# Patient Record
Sex: FEMALE | Race: WHITE | NOT HISPANIC OR LATINO
[De-identification: names, ages, dates, MRNs, and addresses within clinical notes are randomized per-mention and may not be internally consistent; named-entity substitution may affect disease eponyms.]

---

## 2019-09-26 ENCOUNTER — APPOINTMENT (OUTPATIENT)
Dept: SURGERY | Facility: CLINIC | Age: 74
End: 2019-09-26
Payer: MEDICARE

## 2019-09-26 VITALS
HEART RATE: 73 BPM | BODY MASS INDEX: 19.73 KG/M2 | HEIGHT: 61 IN | SYSTOLIC BLOOD PRESSURE: 149 MMHG | DIASTOLIC BLOOD PRESSURE: 77 MMHG | WEIGHT: 104.5 LBS

## 2019-09-26 DIAGNOSIS — F17.200 NICOTINE DEPENDENCE, UNSPECIFIED, UNCOMPLICATED: ICD-10-CM

## 2019-09-26 DIAGNOSIS — Z78.9 OTHER SPECIFIED HEALTH STATUS: ICD-10-CM

## 2019-09-26 PROCEDURE — 99203 OFFICE O/P NEW LOW 30 MIN: CPT

## 2019-09-26 RX ORDER — ATORVASTATIN CALCIUM 40 MG/1
40 TABLET, FILM COATED ORAL
Refills: 0 | Status: ACTIVE | COMMUNITY

## 2019-09-28 NOTE — HISTORY OF PRESENT ILLNESS
[de-identified] : Pt c/o anterior forehead mass for several years since hitting forehead with door and states has continued to increase in size and has discomfort.  denies drainage or infection. \par CT  scan performed 4 weeks post injury (2017) :   Soft tissue prominence

## 2019-09-28 NOTE — PHYSICAL EXAM
[de-identified] : no palpable thyroid nodules [Midline] : located in midline position [Normal] : orientation to person, place, and time: normal [FreeTextEntry2] : well healed left supraorbital scar with bony prominence left frontotemporal region

## 2019-09-28 NOTE — CONSULT LETTER
[Dear  ___] : Dear ~NELLIE, [Consult Letter:] : I had the pleasure of evaluating your patient, [unfilled]. [Please see my note below.] : Please see my note below. [Consult Closing:] : Thank you very much for allowing me to participate in the care of this patient.  If you have any questions, please do not hesitate to contact me. [FreeTextEntry2] : Dr. Luis Barth, Dr. Robert Calderón [FreeTextEntry3] : Gómez Rivera MD, FACS\par System Director, Endocrine Surgery\par [facility]\par  [Sincerely,] : Sincerely, [DrChidi  ___] : Dr. LOYD

## 2019-09-30 ENCOUNTER — FORM ENCOUNTER (OUTPATIENT)
Age: 74
End: 2019-09-30

## 2019-10-01 ENCOUNTER — APPOINTMENT (OUTPATIENT)
Dept: CT IMAGING | Facility: CLINIC | Age: 74
End: 2019-10-01
Payer: MEDICARE

## 2019-10-01 ENCOUNTER — OUTPATIENT (OUTPATIENT)
Dept: OUTPATIENT SERVICES | Facility: HOSPITAL | Age: 74
LOS: 1 days | End: 2019-10-01
Payer: MEDICARE

## 2019-10-01 ENCOUNTER — TRANSCRIPTION ENCOUNTER (OUTPATIENT)
Age: 74
End: 2019-10-01

## 2019-10-01 DIAGNOSIS — Z00.00 ENCOUNTER FOR GENERAL ADULT MEDICAL EXAMINATION WITHOUT ABNORMAL FINDINGS: ICD-10-CM

## 2019-10-01 DIAGNOSIS — R22.0 LOCALIZED SWELLING, MASS AND LUMP, HEAD: ICD-10-CM

## 2019-10-01 PROCEDURE — 70450 CT HEAD/BRAIN W/O DYE: CPT

## 2019-10-01 PROCEDURE — 70450 CT HEAD/BRAIN W/O DYE: CPT | Mod: 26

## 2019-10-07 ENCOUNTER — OTHER (OUTPATIENT)
Age: 74
End: 2019-10-07

## 2019-10-10 ENCOUNTER — FORM ENCOUNTER (OUTPATIENT)
Age: 74
End: 2019-10-10

## 2019-10-11 ENCOUNTER — OUTPATIENT (OUTPATIENT)
Dept: OUTPATIENT SERVICES | Facility: HOSPITAL | Age: 74
LOS: 1 days | End: 2019-10-11

## 2019-10-11 ENCOUNTER — APPOINTMENT (OUTPATIENT)
Dept: MRI IMAGING | Facility: CLINIC | Age: 74
End: 2019-10-11
Payer: MEDICARE

## 2019-10-11 PROCEDURE — 70553 MRI BRAIN STEM W/O & W/DYE: CPT | Mod: 26

## 2019-10-15 ENCOUNTER — OTHER (OUTPATIENT)
Age: 74
End: 2019-10-15

## 2019-11-05 ENCOUNTER — APPOINTMENT (OUTPATIENT)
Dept: SPINE | Facility: CLINIC | Age: 74
End: 2019-11-05
Payer: MEDICARE

## 2019-11-05 VITALS
HEART RATE: 70 BPM | WEIGHT: 105 LBS | SYSTOLIC BLOOD PRESSURE: 142 MMHG | HEIGHT: 61 IN | TEMPERATURE: 98 F | BODY MASS INDEX: 19.83 KG/M2 | DIASTOLIC BLOOD PRESSURE: 75 MMHG

## 2019-11-05 PROCEDURE — 99204 OFFICE O/P NEW MOD 45 MIN: CPT

## 2019-11-08 ENCOUNTER — RESULT REVIEW (OUTPATIENT)
Age: 74
End: 2019-11-08

## 2019-11-08 ENCOUNTER — APPOINTMENT (OUTPATIENT)
Dept: ULTRASOUND IMAGING | Facility: IMAGING CENTER | Age: 74
End: 2019-11-08
Payer: MEDICARE

## 2019-11-08 ENCOUNTER — OUTPATIENT (OUTPATIENT)
Dept: OUTPATIENT SERVICES | Facility: HOSPITAL | Age: 74
LOS: 1 days | End: 2019-11-08
Payer: MEDICARE

## 2019-11-08 DIAGNOSIS — R22.0 LOCALIZED SWELLING, MASS AND LUMP, HEAD: ICD-10-CM

## 2019-11-08 PROCEDURE — 88173 CYTOPATH EVAL FNA REPORT: CPT | Mod: 26

## 2019-11-08 PROCEDURE — 88342 IMHCHEM/IMCYTCHM 1ST ANTB: CPT

## 2019-11-08 PROCEDURE — 88360 TUMOR IMMUNOHISTOCHEM/MANUAL: CPT

## 2019-11-08 PROCEDURE — 88185 FLOWCYTOMETRY/TC ADD-ON: CPT

## 2019-11-08 PROCEDURE — 76942 ECHO GUIDE FOR BIOPSY: CPT

## 2019-11-08 PROCEDURE — 88275 CYTOGENETICS 100-300: CPT

## 2019-11-08 PROCEDURE — 88173 CYTOPATH EVAL FNA REPORT: CPT

## 2019-11-08 PROCEDURE — 20206 BIOPSY MUSCLE PERQ NEEDLE: CPT

## 2019-11-08 PROCEDURE — 88342 IMHCHEM/IMCYTCHM 1ST ANTB: CPT | Mod: 26,59

## 2019-11-08 PROCEDURE — 88341 IMHCHEM/IMCYTCHM EA ADD ANTB: CPT

## 2019-11-08 PROCEDURE — 88189 FLOWCYTOMETRY/READ 16 & >: CPT

## 2019-11-08 PROCEDURE — 88341 IMHCHEM/IMCYTCHM EA ADD ANTB: CPT | Mod: 26,59

## 2019-11-08 PROCEDURE — 76942 ECHO GUIDE FOR BIOPSY: CPT | Mod: 26

## 2019-11-08 PROCEDURE — 87205 SMEAR GRAM STAIN: CPT

## 2019-11-08 PROCEDURE — 88365 INSITU HYBRIDIZATION (FISH): CPT

## 2019-11-08 PROCEDURE — 88360 TUMOR IMMUNOHISTOCHEM/MANUAL: CPT | Mod: 26

## 2019-11-08 PROCEDURE — 88184 FLOWCYTOMETRY/ TC 1 MARKER: CPT

## 2019-11-08 PROCEDURE — 88271 CYTOGENETICS DNA PROBE: CPT

## 2019-11-08 PROCEDURE — 88365 INSITU HYBRIDIZATION (FISH): CPT | Mod: 26

## 2019-11-14 LAB — TM INTERPRETATION: SIGNIFICANT CHANGE UP

## 2019-11-19 ENCOUNTER — OUTPATIENT (OUTPATIENT)
Dept: OUTPATIENT SERVICES | Facility: HOSPITAL | Age: 74
LOS: 1 days | Discharge: HOME | End: 2019-11-19

## 2019-11-19 DIAGNOSIS — C85.90 NON-HODGKIN LYMPHOMA, UNSPECIFIED, UNSPECIFIED SITE: ICD-10-CM

## 2019-11-21 ENCOUNTER — RESULT REVIEW (OUTPATIENT)
Age: 74
End: 2019-11-21

## 2019-11-21 ENCOUNTER — APPOINTMENT (OUTPATIENT)
Dept: HEMATOLOGY ONCOLOGY | Facility: CLINIC | Age: 74
End: 2019-11-21
Payer: MEDICARE

## 2019-11-21 ENCOUNTER — OUTPATIENT (OUTPATIENT)
Dept: OUTPATIENT SERVICES | Facility: HOSPITAL | Age: 74
LOS: 1 days | End: 2019-11-21
Payer: MEDICARE

## 2019-11-21 VITALS
OXYGEN SATURATION: 98 % | HEART RATE: 73 BPM | RESPIRATION RATE: 14 BRPM | BODY MASS INDEX: 20.41 KG/M2 | DIASTOLIC BLOOD PRESSURE: 81 MMHG | SYSTOLIC BLOOD PRESSURE: 138 MMHG | TEMPERATURE: 97.6 F | WEIGHT: 108.03 LBS

## 2019-11-21 DIAGNOSIS — Z80.8 FAMILY HISTORY OF MALIGNANT NEOPLASM OF OTHER ORGANS OR SYSTEMS: ICD-10-CM

## 2019-11-21 DIAGNOSIS — C85.90 NON-HODGKIN LYMPHOMA, UNSPECIFIED, UNSPECIFIED SITE: ICD-10-CM

## 2019-11-21 DIAGNOSIS — Z87.19 PERSONAL HISTORY OF OTHER DISEASES OF THE DIGESTIVE SYSTEM: ICD-10-CM

## 2019-11-21 DIAGNOSIS — Z87.09 PERSONAL HISTORY OF OTHER DISEASES OF THE RESPIRATORY SYSTEM: ICD-10-CM

## 2019-11-21 LAB
BASOPHILS # BLD AUTO: 0.1 K/UL — SIGNIFICANT CHANGE UP (ref 0–0.2)
BASOPHILS NFR BLD AUTO: 0.8 % — SIGNIFICANT CHANGE UP (ref 0–2)
EOSINOPHIL # BLD AUTO: 0 K/UL — SIGNIFICANT CHANGE UP (ref 0–0.5)
EOSINOPHIL NFR BLD AUTO: 0 % — SIGNIFICANT CHANGE UP (ref 0–6)
HCT VFR BLD CALC: 41.5 % — SIGNIFICANT CHANGE UP (ref 34.5–45)
HGB BLD-MCNC: 13.9 G/DL — SIGNIFICANT CHANGE UP (ref 11.5–15.5)
LYMPHOCYTES # BLD AUTO: 28 % — SIGNIFICANT CHANGE UP (ref 13–44)
LYMPHOCYTES # BLD AUTO: 3.3 K/UL — SIGNIFICANT CHANGE UP (ref 1–3.3)
MCHC RBC-ENTMCNC: 29.4 PG — SIGNIFICANT CHANGE UP (ref 27–34)
MCHC RBC-ENTMCNC: 33.5 G/DL — SIGNIFICANT CHANGE UP (ref 32–36)
MCV RBC AUTO: 87.8 FL — SIGNIFICANT CHANGE UP (ref 80–100)
MONOCYTES # BLD AUTO: 0.9 K/UL — SIGNIFICANT CHANGE UP (ref 0–0.9)
MONOCYTES NFR BLD AUTO: 7.6 % — SIGNIFICANT CHANGE UP (ref 2–14)
NEUTROPHILS # BLD AUTO: 7.6 K/UL — HIGH (ref 1.8–7.4)
NEUTROPHILS NFR BLD AUTO: 63.6 % — SIGNIFICANT CHANGE UP (ref 43–77)
PLATELET # BLD AUTO: 336 K/UL — SIGNIFICANT CHANGE UP (ref 150–400)
RBC # BLD: 4.73 M/UL — SIGNIFICANT CHANGE UP (ref 3.8–5.2)
RBC # FLD: 12.6 % — SIGNIFICANT CHANGE UP (ref 10.3–14.5)
WBC # BLD: 11.9 K/UL — HIGH (ref 3.8–10.5)
WBC # FLD AUTO: 11.9 K/UL — HIGH (ref 3.8–10.5)

## 2019-11-21 PROCEDURE — 38222 DX BONE MARROW BX & ASPIR: CPT | Mod: LT

## 2019-11-21 PROCEDURE — 88313 SPECIAL STAINS GROUP 2: CPT

## 2019-11-21 PROCEDURE — 88189 FLOWCYTOMETRY/READ 16 & >: CPT | Mod: 59

## 2019-11-21 PROCEDURE — 88342 IMHCHEM/IMCYTCHM 1ST ANTB: CPT | Mod: 26

## 2019-11-21 PROCEDURE — 88341 IMHCHEM/IMCYTCHM EA ADD ANTB: CPT

## 2019-11-21 PROCEDURE — 88237 TISSUE CULTURE BONE MARROW: CPT

## 2019-11-21 PROCEDURE — 88184 FLOWCYTOMETRY/ TC 1 MARKER: CPT

## 2019-11-21 PROCEDURE — 88313 SPECIAL STAINS GROUP 2: CPT | Mod: 26

## 2019-11-21 PROCEDURE — 88185 FLOWCYTOMETRY/TC ADD-ON: CPT

## 2019-11-21 PROCEDURE — 87205 SMEAR GRAM STAIN: CPT

## 2019-11-21 PROCEDURE — 88280 CHROMOSOME KARYOTYPE STUDY: CPT

## 2019-11-21 PROCEDURE — 85097 BONE MARROW INTERPRETATION: CPT

## 2019-11-21 PROCEDURE — 99204 OFFICE O/P NEW MOD 45 MIN: CPT | Mod: 25

## 2019-11-21 PROCEDURE — 88305 TISSUE EXAM BY PATHOLOGIST: CPT | Mod: 26

## 2019-11-21 PROCEDURE — 88305 TISSUE EXAM BY PATHOLOGIST: CPT

## 2019-11-21 PROCEDURE — 88264 CHROMOSOME ANALYSIS 20-25: CPT

## 2019-11-21 PROCEDURE — 88341 IMHCHEM/IMCYTCHM EA ADD ANTB: CPT | Mod: 26

## 2019-11-21 PROCEDURE — 88342 IMHCHEM/IMCYTCHM 1ST ANTB: CPT

## 2019-11-21 NOTE — CONSULT LETTER
[Dear  ___] : Dear  [unfilled], [Consult Letter:] : I had the pleasure of evaluating your patient, [unfilled]. [Please see my note below.] : Please see my note below. [Consult Closing:] : Thank you very much for allowing me to participate in the care of this patient.  If you have any questions, please do not hesitate to contact me. [Sincerely,] : Sincerely, [FreeTextEntry2] : Dr Shan Yan

## 2019-11-21 NOTE — ASSESSMENT
[FreeTextEntry1] : \par 72 yo woman with PMHx of HLP, IBS, referred for evaluation of diffuse large B cell lymphoma. \par \par Pt complaint of left anterior forehead mass for several years since hitting forehead with door and states has continued to increase in size and has discomfort. denies drainage or infection. Patient developed  during the summer time increase in pressure headaches everyday and also noticed blurry vision. Denies N/V. \par \par CT scan of the head 11/6/19: c/w there is mild soft tissue prominence deep to the marker placed over the left inferior frontal scalp without a discrete mass or fluid collection.\par 10/11/2019- MRI of the brain: Left frontal calvarial / sphenoid bone lesion with extracalvarial and dural involvement suspected. 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 Ki-67 50-60%. Diffuse large B cell lymphoma can not be rule out. FISH for MYC, BCL2 and BCL6 are pending. \par Immunohistochemical stains CD20, PAX5, CD10, BCL6, BCL2 ( weak), CD23, c-MYC (20%)\par CD 30 few cells.\par Negative CD5, cyclin D1, P53. \par CD3 stain highlights small T cells in the background.\par CD21 stain highlight few residual follicular dendritic cell meshwork. \par EBV encoded RNA negative. \par \par I had a detailed discussion today with the patient  and friend regarding the natural history, epidemiology, diagnosis, staging and treatment of  Diffuse Large B cell Lymphoma . I reviewed her laboratory studies in detail today. I then discussed  the need to complete staging before discussing treatment. I explained to them that depending on the staging we will determine the treatment. \par \par Plan:\par \par 1- PET/CT ordered\par 2- Echocardiogram\par 3- Mediport\par 4- Bone marrow biopsy was done today. \par \par I answered all their  questions to satisfaction.\par \par Greater than 50% of the encounter time was spent on counseling and coordination of care for DLBCL      and I have spent  45   minutes of face to face time with the patient. \par \par RTC  for discussion of treatment, and review of staging work up 12/11/19

## 2019-11-21 NOTE — PHYSICAL EXAM
[Obese] : obese [Normal] : affect appropriate [Restricted in physically strenuous activity but ambulatory and able to carry out work of a light or sedentary nature] : Status 1- Restricted in physically strenuous activity but ambulatory and able to carry out work of a light or sedentary nature, e.g., light house work, office work [de-identified] :  left  anterior forehead mass approx 3-4 cm in diameter.

## 2019-11-21 NOTE — HISTORY OF PRESENT ILLNESS
[0 - No Distress] : Distress Level: 0 [de-identified] : 74 yo woman with PMHx of HLP, IBS,  referred for evaluation of diffuse large B cell lymphoma. \par \par Pt complaint of  left  anterior forehead mass for several years since hitting forehead with door and states has continued to increase in size and has discomfort. denies drainage or infection. Patient developed  during the summer time increase in pressure headaches everyday and also noticed blurry vision. Denies N/V. \par \par CT scan of the head 11/6/19: c/w there is mild soft tissue prominence deep to the marker placed over the left inferior frontal scalp without a discrete mass or fluid collection.\par 10/11/2019- MRI of the brain: Left frontal calvarial / sphenoid bone lesion with extracalvarial and dural involvement suspected.  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 Ki-67 50-60%. Diffuse large B cell lymphoma can not be rule out. FISH for MYC, BCL2 and BCL6 are pending. \par Immunohistochemical stains CD20, PAX5, CD10, BCL6, BCL2 ( weak), CD23,  c-MYC (20%)\par CD 30 few cells.\par Negative CD5, cyclin D1, P53. \par CD3 stain highlights small T cells in the background.\par CD21 stain highlight few residual follicular dendritic cell meshwork. \par EBV encoded RNA negative.

## 2019-11-21 NOTE — PROCEDURE
[Bone Marrow Biopsy] : bone marrow biopsy [Bone Marrow Aspiration] : bone marrow aspiration  [Patient] : the patient [Verbal Consent Obtained] : verbal consent was obtained prior to the procedure [Patient identification verified] : patient identification verified [Procedure verified and consent obtained] : procedure verified and consent obtained [Laterality verified and correct site marked] : laterality verified and correct site marked [Left] : site: left [Correct positioning] : correct positioning [Prone] : prone [Superior iliac spine was identified] : the superior iliac spine was identified. [The left posterior iliac crest was prepped with betadine and draped, using sterile technique.] : The left posterior iliac crest was prepped with betadine and draped, using sterile technique. [Lidocaine was injected and into the periosteum overlying the site.] : Lidocaine was injected and into the periosteum overlying the site. [Aspirate] : aspirate [Cytogenetics] : cytogenetics [FISH] : FISH [Biopsy] : biopsy [Flow Cytometry] : flow cytometry [] : The patient was instructed to remove the bandage the following AM. The patient may bathe. Acetaminophen may be taken for discomfort, as per package directions.If there are any other problems, the patient was instructed to call the office. The patient verbalized understanding, and is aware of the office contact numbers. [FreeTextEntry1] : diffuse large B cell lymphoma

## 2019-11-22 ENCOUNTER — RESULT REVIEW (OUTPATIENT)
Age: 74
End: 2019-11-22

## 2019-11-22 LAB — TM INTERPRETATION: SIGNIFICANT CHANGE UP

## 2019-11-25 ENCOUNTER — FORM ENCOUNTER (OUTPATIENT)
Age: 74
End: 2019-11-25

## 2019-11-25 LAB — HEMATOPATHOLOGY REPORT: SIGNIFICANT CHANGE UP

## 2019-11-26 ENCOUNTER — APPOINTMENT (OUTPATIENT)
Dept: NUCLEAR MEDICINE | Facility: CLINIC | Age: 74
End: 2019-11-26
Payer: MEDICARE

## 2019-11-26 ENCOUNTER — OUTPATIENT (OUTPATIENT)
Dept: OUTPATIENT SERVICES | Facility: HOSPITAL | Age: 74
LOS: 1 days | End: 2019-11-26

## 2019-11-26 DIAGNOSIS — C83.39 DIFFUSE LARGE B-CELL LYMPHOMA, EXTRANODAL AND SOLID ORGAN SITES: ICD-10-CM

## 2019-11-26 PROCEDURE — 78815 PET IMAGE W/CT SKULL-THIGH: CPT | Mod: 26,PI

## 2019-12-02 ENCOUNTER — APPOINTMENT (OUTPATIENT)
Dept: CARDIOLOGY | Facility: CLINIC | Age: 74
End: 2019-12-02
Payer: MEDICARE

## 2019-12-02 PROCEDURE — 93306 TTE W/DOPPLER COMPLETE: CPT

## 2019-12-04 ENCOUNTER — LABORATORY RESULT (OUTPATIENT)
Age: 74
End: 2019-12-04

## 2019-12-05 LAB — CHROM ANALY OVERALL INTERP SPEC-IMP: SIGNIFICANT CHANGE UP

## 2019-12-10 ENCOUNTER — APPOINTMENT (OUTPATIENT)
Dept: CV DIAGNOSITCS | Facility: HOSPITAL | Age: 74
End: 2019-12-10

## 2019-12-11 ENCOUNTER — APPOINTMENT (OUTPATIENT)
Dept: HEMATOLOGY ONCOLOGY | Facility: CLINIC | Age: 74
End: 2019-12-11
Payer: MEDICARE

## 2019-12-11 ENCOUNTER — LABORATORY RESULT (OUTPATIENT)
Age: 74
End: 2019-12-11

## 2019-12-11 ENCOUNTER — OUTPATIENT (OUTPATIENT)
Dept: OUTPATIENT SERVICES | Facility: HOSPITAL | Age: 74
LOS: 1 days | End: 2019-12-11
Payer: MEDICARE

## 2019-12-11 ENCOUNTER — RESULT REVIEW (OUTPATIENT)
Age: 74
End: 2019-12-11

## 2019-12-11 ENCOUNTER — APPOINTMENT (OUTPATIENT)
Dept: CV DIAGNOSITCS | Facility: HOSPITAL | Age: 74
End: 2019-12-11

## 2019-12-11 VITALS
HEART RATE: 76 BPM | SYSTOLIC BLOOD PRESSURE: 139 MMHG | OXYGEN SATURATION: 98 % | DIASTOLIC BLOOD PRESSURE: 87 MMHG | RESPIRATION RATE: 18 BRPM | BODY MASS INDEX: 20.41 KG/M2 | TEMPERATURE: 97.6 F | WEIGHT: 108 LBS

## 2019-12-11 DIAGNOSIS — C83.39 DIFFUSE LARGE B-CELL LYMPHOMA, EXTRANODAL AND SOLID ORGAN SITES: ICD-10-CM

## 2019-12-11 LAB
ALBUMIN SERPL ELPH-MCNC: 4.2 G/DL
ALP BLD-CCNC: 54 U/L
ALT SERPL-CCNC: 16 U/L
ANION GAP SERPL CALC-SCNC: 12 MMOL/L
APTT BLD: 29.7 SEC
AST SERPL-CCNC: 18 U/L
BASOPHILS # BLD AUTO: 0.1 K/UL — SIGNIFICANT CHANGE UP (ref 0–0.2)
BASOPHILS NFR BLD AUTO: 0.9 % — SIGNIFICANT CHANGE UP (ref 0–2)
BILIRUB SERPL-MCNC: 0.2 MG/DL
BUN SERPL-MCNC: 13 MG/DL
CALCIUM SERPL-MCNC: 9.5 MG/DL
CHLORIDE SERPL-SCNC: 105 MMOL/L
CO2 SERPL-SCNC: 26 MMOL/L
CREAT SERPL-MCNC: 0.87 MG/DL
EOSINOPHIL # BLD AUTO: 0.1 K/UL — SIGNIFICANT CHANGE UP (ref 0–0.5)
EOSINOPHIL NFR BLD AUTO: 1.1 % — SIGNIFICANT CHANGE UP (ref 0–6)
GLUCOSE SERPL-MCNC: 81 MG/DL
HAV IGM SER QL: NONREACTIVE
HAV IGM SER QL: NONREACTIVE
HBV CORE IGG+IGM SER QL: NONREACTIVE
HBV CORE IGM SER QL: NONREACTIVE
HBV CORE IGM SER QL: NONREACTIVE
HBV SURFACE AB SER QL: NONREACTIVE
HBV SURFACE AG SER QL: NONREACTIVE
HBV SURFACE AG SER QL: NONREACTIVE
HCT VFR BLD CALC: 42 % — SIGNIFICANT CHANGE UP (ref 34.5–45)
HCV AB SER QL: NONREACTIVE
HCV S/CO RATIO: 0.22 S/CO
HGB BLD-MCNC: 14 G/DL — SIGNIFICANT CHANGE UP (ref 11.5–15.5)
INR PPP: 1 RATIO
LDH SERPL-CCNC: 173 U/L
LYMPHOCYTES # BLD AUTO: 1.8 K/UL — SIGNIFICANT CHANGE UP (ref 1–3.3)
LYMPHOCYTES # BLD AUTO: 16.2 % — SIGNIFICANT CHANGE UP (ref 13–44)
MCHC RBC-ENTMCNC: 29.4 PG — SIGNIFICANT CHANGE UP (ref 27–34)
MCHC RBC-ENTMCNC: 33.2 G/DL — SIGNIFICANT CHANGE UP (ref 32–36)
MCV RBC AUTO: 88.5 FL — SIGNIFICANT CHANGE UP (ref 80–100)
MONOCYTES # BLD AUTO: 0.6 K/UL — SIGNIFICANT CHANGE UP (ref 0–0.9)
MONOCYTES NFR BLD AUTO: 5.5 % — SIGNIFICANT CHANGE UP (ref 2–14)
NEUTROPHILS # BLD AUTO: 8.7 K/UL — HIGH (ref 1.8–7.4)
NEUTROPHILS NFR BLD AUTO: 76.3 % — SIGNIFICANT CHANGE UP (ref 43–77)
PLATELET # BLD AUTO: 342 K/UL — SIGNIFICANT CHANGE UP (ref 150–400)
POTASSIUM SERPL-SCNC: 4 MMOL/L
PROT SERPL-MCNC: 6.5 G/DL
PT BLD: 11.5 SEC
RBC # BLD: 4.75 M/UL — SIGNIFICANT CHANGE UP (ref 3.8–5.2)
RBC # FLD: 12.8 % — SIGNIFICANT CHANGE UP (ref 10.3–14.5)
SODIUM SERPL-SCNC: 143 MMOL/L
WBC # BLD: 11.4 K/UL — HIGH (ref 3.8–10.5)
WBC # FLD AUTO: 11.4 K/UL — HIGH (ref 3.8–10.5)

## 2019-12-11 PROCEDURE — 93306 TTE W/DOPPLER COMPLETE: CPT | Mod: 26

## 2019-12-11 PROCEDURE — 0399T: CPT

## 2019-12-11 PROCEDURE — 99214 OFFICE O/P EST MOD 30 MIN: CPT

## 2019-12-11 PROCEDURE — 93356 MYOCRD STRAIN IMG SPCKL TRCK: CPT

## 2019-12-11 PROCEDURE — 93306 TTE W/DOPPLER COMPLETE: CPT

## 2019-12-11 NOTE — ASSESSMENT
[FreeTextEntry1] : \par 74 yo woman with PMHx of HLP, IBS, referred for evaluation of diffuse large B cell lymphoma. \par \par Pt complaint of left anterior forehead mass for several years since hitting forehead with door and states has continued to increase in size and has discomfort. denies drainage or infection. Patient developed  during the summer time increase in pressure headaches everyday and also noticed blurry vision. Denies N/V. \par \par CT scan of the head 11/6/19: c/w there is mild soft tissue prominence deep to the marker placed over the left inferior frontal scalp without a discrete mass or fluid collection.\par 10/11/2019- MRI of the brain: Left frontal calvarial / sphenoid bone lesion with extracalvarial and dural involvement suspected. 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 Ki-67 50-60%. Diffuse large B cell lymphoma can not be rule out. FISH for MYC, BCL2 and BCL6 are pending. \par Immunohistochemical stains CD20, PAX5, CD10, BCL6, BCL2 ( weak), CD23, c-MYC (20%)\par CD 30 few cells.\par Negative CD5, cyclin D1, P53. \par CD3 stain highlights small T cells in the background.\par CD21 stain highlight few residual follicular dendritic cell meshwork. \par EBV encoded RNA negative. \par \par Bone marrow biopsy 11/21/19 negative. \par \par PET/CT 11/26/19 \par \par 1. FDG avid left frontal scalp soft tissue thickening, consistent with biopsy-proven lymphoma. \par Underlying marrow signal abnormality seen on MRI is not definitively appreciated on PET/CT. \par 2. Minimally FDG avid 0.5 cm probable lymph node left parotid tail. \par 3. No FDG avid disease below the diaphragm.\par \par Patient had an echocardiogram done 12/11/19  EF pending. \par Hepatitis panel was negative. \par Scheduled for mediport on 12/13/19. \par Scheduled for R-CHOP x 6 cycles starting 12/16/19.\par \par I had a detailed discussion today with the patient  and friend regarding the natural history, epidemiology, diagnosis, staging and treatment of  Diffuse Large B cell Lymphoma .  Reviewed of the literature in few cases reported in the literature, have described this presentation as been aggressive, patients were treated with 6 cycles of R-CHOP or other chemotherapy combination agents containing Rituxan followed by radiation therapy to the scalp (site of the tumor). Patients treated with this regimen went on remission and maintain it.   \par I reviewed with patient the benefits versus risks of R-CHOP therapy x  6 cycles, every 3 weeks, with Neulasta support. I answered all their  questions to satisfaction. I provided the patient with literature to review side effects from therapy. Patient signed consent. \par \par RTC  1/2/2020 for follow up.

## 2019-12-11 NOTE — CONSULT LETTER
[Dear  ___] : Dear  [unfilled], [Consult Letter:] : I had the pleasure of evaluating your patient, [unfilled]. [Consult Closing:] : Thank you very much for allowing me to participate in the care of this patient.  If you have any questions, please do not hesitate to contact me. [Please see my note below.] : Please see my note below. [Sincerely,] : Sincerely, [FreeTextEntry2] : Dr Shan Yan

## 2019-12-11 NOTE — PHYSICAL EXAM
[Restricted in physically strenuous activity but ambulatory and able to carry out work of a light or sedentary nature] : Status 1- Restricted in physically strenuous activity but ambulatory and able to carry out work of a light or sedentary nature, e.g., light house work, office work [Obese] : obese [Normal] : affect appropriate [de-identified] :  left  anterior forehead mass approx 3-4 cm in diameter.

## 2019-12-11 NOTE — PROCEDURE
[Bone Marrow Aspiration] : bone marrow aspiration  [Bone Marrow Biopsy] : bone marrow biopsy [Patient] : the patient [Verbal Consent Obtained] : verbal consent was obtained prior to the procedure [Procedure verified and consent obtained] : procedure verified and consent obtained [Patient identification verified] : patient identification verified [Laterality verified and correct site marked] : laterality verified and correct site marked [Correct positioning] : correct positioning [Left] : site: left [Superior iliac spine was identified] : the superior iliac spine was identified. [Prone] : prone [Lidocaine was injected and into the periosteum overlying the site.] : Lidocaine was injected and into the periosteum overlying the site. [The left posterior iliac crest was prepped with betadine and draped, using sterile technique.] : The left posterior iliac crest was prepped with betadine and draped, using sterile technique. [Cytogenetics] : cytogenetics [Aspirate] : aspirate [FISH] : FISH [Biopsy] : biopsy [] : The patient was instructed to remove the bandage the following AM. The patient may bathe. Acetaminophen may be taken for discomfort, as per package directions.If there are any other problems, the patient was instructed to call the office. The patient verbalized understanding, and is aware of the office contact numbers. [Flow Cytometry] : flow cytometry [FreeTextEntry1] : diffuse large B cell lymphoma

## 2019-12-11 NOTE — HISTORY OF PRESENT ILLNESS
[0 - No Distress] : Distress Level: 0 [de-identified] : 72 yo woman with PMHx of HLP, IBS,  referred for evaluation of diffuse large B cell lymphoma. \par \par Pt complaint of  left  anterior forehead mass for several years since hitting forehead with door and states has continued to increase in size and has discomfort. denies drainage or infection. Patient developed  during the summer time increase in pressure headaches everyday and also noticed blurry vision. Denies N/V. \par \par CT scan of the head 11/6/19: c/w there is mild soft tissue prominence deep to the marker placed over the left inferior frontal scalp without a discrete mass or fluid collection.\par 10/11/2019- MRI of the brain: Left frontal calvarial / sphenoid bone lesion with extracalvarial and dural involvement suspected.  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 Ki-67 50-60%. Diffuse large B cell lymphoma can not be rule out. FISH for MYC, BCL2 and BCL6 are pending. \par Immunohistochemical stains CD20, PAX5, CD10, BCL6, BCL2 ( weak), CD23,  c-MYC (20%)\par CD 30 few cells.\par Negative CD5, cyclin D1, P53. \par CD3 stain highlights small T cells in the background.\par CD21 stain highlight few residual follicular dendritic cell meshwork. \par EBV encoded RNA negative.  [de-identified] : Bone marrow biopsy 11/21/19 negative. \par \par PET/CT 11/26/19 \par \par 1. FDG avid left frontal scalp soft tissue thickening, consistent with biopsy-proven lymphoma. \par Underlying marrow signal abnormality seen on MRI is not definitively appreciated on PET/CT. \par 2. Minimally FDG avid 0.5 cm probable lymph node left parotid tail. \par 3. No FDG avid disease below the diaphragm.\par \par Patient had an echocardiogram done this morning EF pending. \par Hepatitis panel was negative. \par Scheduled for mediport on 12/13/19. \par \par \par No other changes in her medical, surgical or social history since 11/21/19.

## 2019-12-12 ENCOUNTER — FORM ENCOUNTER (OUTPATIENT)
Age: 74
End: 2019-12-12

## 2019-12-13 ENCOUNTER — OTHER (OUTPATIENT)
Age: 74
End: 2019-12-13

## 2019-12-13 ENCOUNTER — OUTPATIENT (OUTPATIENT)
Dept: OUTPATIENT SERVICES | Facility: HOSPITAL | Age: 74
LOS: 1 days | End: 2019-12-13
Payer: MEDICARE

## 2019-12-13 DIAGNOSIS — C83.39 DIFFUSE LARGE B-CELL LYMPHOMA, EXTRANODAL AND SOLID ORGAN SITES: ICD-10-CM

## 2019-12-13 PROCEDURE — 76937 US GUIDE VASCULAR ACCESS: CPT | Mod: 26

## 2019-12-13 PROCEDURE — C1894: CPT

## 2019-12-13 PROCEDURE — 36561 INSERT TUNNELED CV CATH: CPT

## 2019-12-13 PROCEDURE — 77001 FLUOROGUIDE FOR VEIN DEVICE: CPT | Mod: 26

## 2019-12-13 PROCEDURE — C1788: CPT

## 2019-12-13 PROCEDURE — 77001 FLUOROGUIDE FOR VEIN DEVICE: CPT

## 2019-12-13 PROCEDURE — C1769: CPT

## 2019-12-13 PROCEDURE — 76937 US GUIDE VASCULAR ACCESS: CPT

## 2019-12-17 DIAGNOSIS — Z45.2 ENCOUNTER FOR ADJUSTMENT AND MANAGEMENT OF VASCULAR ACCESS DEVICE: ICD-10-CM

## 2019-12-17 DIAGNOSIS — C85.90 NON-HODGKIN LYMPHOMA, UNSPECIFIED, UNSPECIFIED SITE: ICD-10-CM

## 2019-12-19 ENCOUNTER — APPOINTMENT (OUTPATIENT)
Dept: INFUSION THERAPY | Facility: HOSPITAL | Age: 74
End: 2019-12-19

## 2019-12-19 ENCOUNTER — RESULT REVIEW (OUTPATIENT)
Age: 74
End: 2019-12-19

## 2019-12-19 ENCOUNTER — LABORATORY RESULT (OUTPATIENT)
Age: 74
End: 2019-12-19

## 2019-12-19 LAB
BASOPHILS # BLD AUTO: 0.1 K/UL — SIGNIFICANT CHANGE UP (ref 0–0.2)
BASOPHILS NFR BLD AUTO: 0.7 % — SIGNIFICANT CHANGE UP (ref 0–2)
EOSINOPHIL # BLD AUTO: 0 K/UL — SIGNIFICANT CHANGE UP (ref 0–0.5)
EOSINOPHIL NFR BLD AUTO: 0 % — SIGNIFICANT CHANGE UP (ref 0–6)
HCT VFR BLD CALC: 40.7 % — SIGNIFICANT CHANGE UP (ref 34.5–45)
HGB BLD-MCNC: 13.4 G/DL — SIGNIFICANT CHANGE UP (ref 11.5–15.5)
LYMPHOCYTES # BLD AUTO: 2.4 K/UL — SIGNIFICANT CHANGE UP (ref 1–3.3)
LYMPHOCYTES # BLD AUTO: 20.6 % — SIGNIFICANT CHANGE UP (ref 13–44)
MCHC RBC-ENTMCNC: 29.2 PG — SIGNIFICANT CHANGE UP (ref 27–34)
MCHC RBC-ENTMCNC: 33 G/DL — SIGNIFICANT CHANGE UP (ref 32–36)
MCV RBC AUTO: 88.6 FL — SIGNIFICANT CHANGE UP (ref 80–100)
MONOCYTES # BLD AUTO: 1.1 K/UL — HIGH (ref 0–0.9)
MONOCYTES NFR BLD AUTO: 9.2 % — SIGNIFICANT CHANGE UP (ref 2–14)
NEUTROPHILS # BLD AUTO: 8 K/UL — HIGH (ref 1.8–7.4)
NEUTROPHILS NFR BLD AUTO: 69.5 % — SIGNIFICANT CHANGE UP (ref 43–77)
PLATELET # BLD AUTO: 315 K/UL — SIGNIFICANT CHANGE UP (ref 150–400)
RBC # BLD: 4.59 M/UL — SIGNIFICANT CHANGE UP (ref 3.8–5.2)
RBC # FLD: 12.7 % — SIGNIFICANT CHANGE UP (ref 10.3–14.5)
WBC # BLD: 11.6 K/UL — HIGH (ref 3.8–10.5)
WBC # FLD AUTO: 11.6 K/UL — HIGH (ref 3.8–10.5)

## 2019-12-20 DIAGNOSIS — R11.2 NAUSEA WITH VOMITING, UNSPECIFIED: ICD-10-CM

## 2019-12-20 DIAGNOSIS — Z51.89 ENCOUNTER FOR OTHER SPECIFIED AFTERCARE: ICD-10-CM

## 2019-12-20 DIAGNOSIS — Z51.11 ENCOUNTER FOR ANTINEOPLASTIC CHEMOTHERAPY: ICD-10-CM

## 2019-12-30 ENCOUNTER — OUTPATIENT (OUTPATIENT)
Dept: OUTPATIENT SERVICES | Facility: HOSPITAL | Age: 74
LOS: 1 days | Discharge: HOME | End: 2019-12-30

## 2019-12-30 DIAGNOSIS — C85.90 NON-HODGKIN LYMPHOMA, UNSPECIFIED, UNSPECIFIED SITE: ICD-10-CM

## 2020-01-02 ENCOUNTER — RESULT REVIEW (OUTPATIENT)
Age: 75
End: 2020-01-02

## 2020-01-02 ENCOUNTER — APPOINTMENT (OUTPATIENT)
Dept: HEMATOLOGY ONCOLOGY | Facility: CLINIC | Age: 75
End: 2020-01-02
Payer: MEDICARE

## 2020-01-02 VITALS
HEART RATE: 78 BPM | BODY MASS INDEX: 20.04 KG/M2 | TEMPERATURE: 97.6 F | OXYGEN SATURATION: 98 % | WEIGHT: 106.04 LBS | DIASTOLIC BLOOD PRESSURE: 76 MMHG | RESPIRATION RATE: 16 BRPM | SYSTOLIC BLOOD PRESSURE: 114 MMHG

## 2020-01-02 LAB
BASOPHILS # BLD AUTO: 0.1 K/UL — SIGNIFICANT CHANGE UP (ref 0–0.2)
BASOPHILS NFR BLD AUTO: 0.7 % — SIGNIFICANT CHANGE UP (ref 0–2)
EOSINOPHIL # BLD AUTO: 0.2 K/UL — SIGNIFICANT CHANGE UP (ref 0–0.5)
EOSINOPHIL NFR BLD AUTO: 1 % — SIGNIFICANT CHANGE UP (ref 0–6)
HCT VFR BLD CALC: 35.8 % — SIGNIFICANT CHANGE UP (ref 34.5–45)
HGB BLD-MCNC: 12.3 G/DL — SIGNIFICANT CHANGE UP (ref 11.5–15.5)
LYMPHOCYTES # BLD AUTO: 11.8 % — LOW (ref 13–44)
LYMPHOCYTES # BLD AUTO: 2.4 K/UL — SIGNIFICANT CHANGE UP (ref 1–3.3)
MCHC RBC-ENTMCNC: 30 PG — SIGNIFICANT CHANGE UP (ref 27–34)
MCHC RBC-ENTMCNC: 34.3 G/DL — SIGNIFICANT CHANGE UP (ref 32–36)
MCV RBC AUTO: 87.6 FL — SIGNIFICANT CHANGE UP (ref 80–100)
MONOCYTES # BLD AUTO: 0.7 K/UL — SIGNIFICANT CHANGE UP (ref 0–0.9)
MONOCYTES NFR BLD AUTO: 3.3 % — SIGNIFICANT CHANGE UP (ref 2–14)
NEUTROPHILS # BLD AUTO: 17 K/UL — HIGH (ref 1.8–7.4)
NEUTROPHILS NFR BLD AUTO: 83.3 % — HIGH (ref 43–77)
PLATELET # BLD AUTO: 320 K/UL — SIGNIFICANT CHANGE UP (ref 150–400)
RBC # BLD: 4.08 M/UL — SIGNIFICANT CHANGE UP (ref 3.8–5.2)
RBC # FLD: 12.7 % — SIGNIFICANT CHANGE UP (ref 10.3–14.5)
WBC # BLD: 20.4 K/UL — HIGH (ref 3.8–10.5)
WBC # FLD AUTO: 20.4 K/UL — HIGH (ref 3.8–10.5)

## 2020-01-02 PROCEDURE — 99214 OFFICE O/P EST MOD 30 MIN: CPT

## 2020-01-02 NOTE — ASSESSMENT
[FreeTextEntry1] : \par 72 yo woman with PMHx of HLP, IBS, diagnosed with  diffuse large B cell lymphoma localized to the scalp. \par \par CT scan of the head 11/6/19: c/w there is mild soft tissue prominence deep to the marker placed over the left inferior frontal scalp without a discrete mass or fluid collection.\par 10/11/2019- MRI of the brain: Left frontal calvarial / sphenoid bone lesion with extracalvarial and dural involvement suspected. 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 Ki-67 50-60%. Diffuse large B cell lymphoma can not be rule out. FISH for MYC, BCL2 and BCL6 are pending. \par Immunohistochemical stains CD20, PAX5, CD10, BCL6, BCL2 ( weak), CD23, c-MYC (20%)\par CD 30 few cells.\par Negative CD5, cyclin D1, P53. \par CD3 stain highlights small T cells in the background.\par CD21 stain highlight few residual follicular dendritic cell meshwork. \par EBV encoded RNA negative. \par \par Bone marrow biopsy 11/21/19 negative. \par \par PET/CT 11/26/19 \par \par 1. FDG avid left frontal scalp soft tissue thickening, consistent with biopsy-proven lymphoma. \par Underlying marrow signal abnormality seen on MRI is not definitively appreciated on PET/CT. \par 2. Minimally FDG avid 0.5 cm probable lymph node left parotid tail. \par 3. No FDG avid disease below the diaphragm.\par \par Patient had an echocardiogram done 12/11/19  EF 70%\par \par 12/16/19- S/P first cycle of R-CHOP complicated by mucositis, thrush and generalized weakness. Will reduce to Mini-CHOP doses for the remaining cycles. \par \par RTC 4 weeks. \par

## 2020-01-02 NOTE — REVIEW OF SYSTEMS
[Anxiety] : anxiety [Negative] : Allergic/Immunologic [FreeTextEntry4] : mouth discomfort , tongue discomfort.

## 2020-01-02 NOTE — HISTORY OF PRESENT ILLNESS
[0 - No Distress] : Distress Level: 0 [de-identified] : 74 yo woman with PMHx of HLP, IBS,  referred for evaluation of diffuse large B cell lymphoma. \par \par Pt complaint of  left  anterior forehead mass for several years since hitting forehead with door and states has continued to increase in size and has discomfort. denies drainage or infection. Patient developed  during the summer time increase in pressure headaches everyday and also noticed blurry vision. Denies N/V. \par \par CT scan of the head 11/6/19: c/w there is mild soft tissue prominence deep to the marker placed over the left inferior frontal scalp without a discrete mass or fluid collection.\par 10/11/2019- MRI of the brain: Left frontal calvarial / sphenoid bone lesion with extracalvarial and dural involvement suspected.  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 Ki-67 50-60%. Diffuse large B cell lymphoma can not be rule out. FISH for MYC, BCL2 and BCL6 are pending. \par Immunohistochemical stains CD20, PAX5, CD10, BCL6, BCL2 ( weak), CD23,  c-MYC (20%)\par CD 30 few cells.\par Negative CD5, cyclin D1, P53. \par CD3 stain highlights small T cells in the background.\par CD21 stain highlight few residual follicular dendritic cell meshwork. \par EBV encoded RNA negative. \par \par Bone marrow biopsy 11/21/19 negative. \par \par PET/CT 11/26/19 \par \par 1. FDG avid left frontal scalp soft tissue thickening, consistent with biopsy-proven lymphoma. \par Underlying marrow signal abnormality seen on MRI is not definitively appreciated on PET/CT. \par 2. Minimally FDG avid 0.5 cm probable lymph node left parotid tail. \par 3. No FDG avid disease below the diaphragm.\par \par \par S/P first cycle of R-CHOP complicated by mucositis, thrush and generalized weakness.  [de-identified] : \par S/P first cycle of R-CHOP complicated by grade 2-3  mucositis, thrush and generalized weakness. \par \par Patient is feeling better, thrush has cleared. \par \par \par No other changes in her medical, surgical or social history since 12/11/19.

## 2020-01-02 NOTE — PHYSICAL EXAM
[Restricted in physically strenuous activity but ambulatory and able to carry out work of a light or sedentary nature] : Status 1- Restricted in physically strenuous activity but ambulatory and able to carry out work of a light or sedentary nature, e.g., light house work, office work [Normal] : affect appropriate [Thin] : thin [de-identified] :  left  anterior forehead mass approx 0.5 cm in diameter.

## 2020-01-06 LAB
ALBUMIN SERPL ELPH-MCNC: 4 G/DL
ALP BLD-CCNC: 109 U/L
ALT SERPL-CCNC: 20 U/L
ANION GAP SERPL CALC-SCNC: 14 MMOL/L
AST SERPL-CCNC: 19 U/L
BILIRUB SERPL-MCNC: <0.2 MG/DL
BUN SERPL-MCNC: 9 MG/DL
CALCIUM SERPL-MCNC: 9.8 MG/DL
CHLORIDE SERPL-SCNC: 101 MMOL/L
CO2 SERPL-SCNC: 24 MMOL/L
CREAT SERPL-MCNC: 0.74 MG/DL
GLUCOSE SERPL-MCNC: 95 MG/DL
LDH SERPL-CCNC: 298 U/L
POTASSIUM SERPL-SCNC: 4.3 MMOL/L
PROT SERPL-MCNC: 6.3 G/DL
SODIUM SERPL-SCNC: 139 MMOL/L

## 2020-01-09 ENCOUNTER — LABORATORY RESULT (OUTPATIENT)
Age: 75
End: 2020-01-09

## 2020-01-09 ENCOUNTER — APPOINTMENT (OUTPATIENT)
Dept: INFUSION THERAPY | Facility: HOSPITAL | Age: 75
End: 2020-01-09

## 2020-01-09 ENCOUNTER — RESULT REVIEW (OUTPATIENT)
Age: 75
End: 2020-01-09

## 2020-01-09 LAB
BASOPHILS # BLD AUTO: 0.1 K/UL — SIGNIFICANT CHANGE UP (ref 0–0.2)
BASOPHILS NFR BLD AUTO: 0.8 % — SIGNIFICANT CHANGE UP (ref 0–2)
EOSINOPHIL # BLD AUTO: 0.1 K/UL — SIGNIFICANT CHANGE UP (ref 0–0.5)
EOSINOPHIL NFR BLD AUTO: 1.1 % — SIGNIFICANT CHANGE UP (ref 0–6)
HCT VFR BLD CALC: 36.1 % — SIGNIFICANT CHANGE UP (ref 34.5–45)
HGB BLD-MCNC: 12.2 G/DL — SIGNIFICANT CHANGE UP (ref 11.5–15.5)
LYMPHOCYTES # BLD AUTO: 16 % — SIGNIFICANT CHANGE UP (ref 13–44)
LYMPHOCYTES # BLD AUTO: 2 K/UL — SIGNIFICANT CHANGE UP (ref 1–3.3)
MCHC RBC-ENTMCNC: 29.5 PG — SIGNIFICANT CHANGE UP (ref 27–34)
MCHC RBC-ENTMCNC: 33.7 G/DL — SIGNIFICANT CHANGE UP (ref 32–36)
MCV RBC AUTO: 87.4 FL — SIGNIFICANT CHANGE UP (ref 80–100)
MONOCYTES # BLD AUTO: 1.2 K/UL — HIGH (ref 0–0.9)
MONOCYTES NFR BLD AUTO: 9.7 % — SIGNIFICANT CHANGE UP (ref 2–14)
NEUTROPHILS # BLD AUTO: 9.2 K/UL — HIGH (ref 1.8–7.4)
NEUTROPHILS NFR BLD AUTO: 72.4 % — SIGNIFICANT CHANGE UP (ref 43–77)
PLATELET # BLD AUTO: 487 K/UL — HIGH (ref 150–400)
RBC # BLD: 4.13 M/UL — SIGNIFICANT CHANGE UP (ref 3.8–5.2)
RBC # FLD: 13.3 % — SIGNIFICANT CHANGE UP (ref 10.3–14.5)
WBC # BLD: 12.7 K/UL — HIGH (ref 3.8–10.5)
WBC # FLD AUTO: 12.7 K/UL — HIGH (ref 3.8–10.5)

## 2020-01-10 DIAGNOSIS — R11.2 NAUSEA WITH VOMITING, UNSPECIFIED: ICD-10-CM

## 2020-01-10 DIAGNOSIS — Z51.11 ENCOUNTER FOR ANTINEOPLASTIC CHEMOTHERAPY: ICD-10-CM

## 2020-01-10 DIAGNOSIS — Z51.89 ENCOUNTER FOR OTHER SPECIFIED AFTERCARE: ICD-10-CM

## 2020-01-17 ENCOUNTER — APPOINTMENT (OUTPATIENT)
Dept: HEMATOLOGY ONCOLOGY | Facility: CLINIC | Age: 75
End: 2020-01-17

## 2020-01-23 ENCOUNTER — OUTPATIENT (OUTPATIENT)
Dept: OUTPATIENT SERVICES | Facility: HOSPITAL | Age: 75
LOS: 1 days | Discharge: HOME | End: 2020-01-23

## 2020-01-23 DIAGNOSIS — C85.90 NON-HODGKIN LYMPHOMA, UNSPECIFIED, UNSPECIFIED SITE: ICD-10-CM

## 2020-01-24 ENCOUNTER — APPOINTMENT (OUTPATIENT)
Dept: HEMATOLOGY ONCOLOGY | Facility: CLINIC | Age: 75
End: 2020-01-24
Payer: MEDICARE

## 2020-01-24 ENCOUNTER — RESULT REVIEW (OUTPATIENT)
Age: 75
End: 2020-01-24

## 2020-01-24 VITALS
OXYGEN SATURATION: 100 % | TEMPERATURE: 98.8 F | RESPIRATION RATE: 18 BRPM | DIASTOLIC BLOOD PRESSURE: 80 MMHG | BODY MASS INDEX: 20.24 KG/M2 | WEIGHT: 107.14 LBS | HEART RATE: 73 BPM | SYSTOLIC BLOOD PRESSURE: 122 MMHG

## 2020-01-24 LAB
BASOPHILS # BLD AUTO: 0.1 K/UL — SIGNIFICANT CHANGE UP (ref 0–0.2)
BASOPHILS NFR BLD AUTO: 0.6 % — SIGNIFICANT CHANGE UP (ref 0–2)
EOSINOPHIL # BLD AUTO: 0.2 K/UL — SIGNIFICANT CHANGE UP (ref 0–0.5)
EOSINOPHIL NFR BLD AUTO: 1.5 % — SIGNIFICANT CHANGE UP (ref 0–6)
HCT VFR BLD CALC: 36.3 % — SIGNIFICANT CHANGE UP (ref 34.5–45)
HGB BLD-MCNC: 12.1 G/DL — SIGNIFICANT CHANGE UP (ref 11.5–15.5)
LYMPHOCYTES # BLD AUTO: 13.5 % — SIGNIFICANT CHANGE UP (ref 13–44)
LYMPHOCYTES # BLD AUTO: 2.2 K/UL — SIGNIFICANT CHANGE UP (ref 1–3.3)
MCHC RBC-ENTMCNC: 29.1 PG — SIGNIFICANT CHANGE UP (ref 27–34)
MCHC RBC-ENTMCNC: 33.3 G/DL — SIGNIFICANT CHANGE UP (ref 32–36)
MCV RBC AUTO: 87.2 FL — SIGNIFICANT CHANGE UP (ref 80–100)
MONOCYTES # BLD AUTO: 1.2 K/UL — HIGH (ref 0–0.9)
MONOCYTES NFR BLD AUTO: 7.1 % — SIGNIFICANT CHANGE UP (ref 2–14)
NEUTROPHILS # BLD AUTO: 12.5 K/UL — HIGH (ref 1.8–7.4)
NEUTROPHILS NFR BLD AUTO: 77.4 % — HIGH (ref 43–77)
PLATELET # BLD AUTO: 199 K/UL — SIGNIFICANT CHANGE UP (ref 150–400)
RBC # BLD: 4.16 M/UL — SIGNIFICANT CHANGE UP (ref 3.8–5.2)
RBC # FLD: 14.5 % — SIGNIFICANT CHANGE UP (ref 10.3–14.5)
WBC # BLD: 16.2 K/UL — HIGH (ref 3.8–10.5)
WBC # FLD AUTO: 16.2 K/UL — HIGH (ref 3.8–10.5)

## 2020-01-24 PROCEDURE — 99213 OFFICE O/P EST LOW 20 MIN: CPT

## 2020-01-24 NOTE — ASSESSMENT
[FreeTextEntry1] : \par 72 yo woman with PMHx of HLP, IBS, diagnosed with  diffuse large B cell lymphoma localized to the scalp. \par \par CT scan of the head 11/6/19: c/w there is mild soft tissue prominence deep to the marker placed over the left inferior frontal scalp without a discrete mass or fluid collection.\par 10/11/2019- MRI of the brain: Left frontal calvarial / sphenoid bone lesion with extracalvarial and dural involvement suspected. 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 Ki-67 50-60%. Diffuse large B cell lymphoma can not be rule out. FISH for MYC, BCL2 and BCL6 are pending. \par Immunohistochemical stains CD20, PAX5, CD10, BCL6, BCL2 ( weak), CD23, c-MYC (20%)\par CD 30 few cells.\par Negative CD5, cyclin D1, P53. \par CD3 stain highlights small T cells in the background.\par CD21 stain highlight few residual follicular dendritic cell meshwork. \par EBV encoded RNA negative. \par \par Bone marrow biopsy 11/21/19 negative. \par \par PET/CT 11/26/19 \par \par 1. FDG avid left frontal scalp soft tissue thickening, consistent with biopsy-proven lymphoma. \par Underlying marrow signal abnormality seen on MRI is not definitively appreciated on PET/CT. \par 2. Minimally FDG avid 0.5 cm probable lymph node left parotid tail. \par 3. No FDG avid disease below the diaphragm.\par \par Patient had an echocardiogram done 12/11/19  EF 70%\par \par 12/19/19- S/P first cycle of R-CHOP complicated by grade 2-3  mucositis, thrush and generalized weakness. \par 1/9/2020- Cycle # 2 Mini-CHOP, tolerated well.  \par Scheduled for cycle # 3 of Mini-CHOP on 1/30/2020. \par \par RTC 1 month. \par

## 2020-01-24 NOTE — REVIEW OF SYSTEMS
[Anxiety] : anxiety [Negative] : Heme/Lymph [FreeTextEntry4] : mouth discomfort , tongue discomfort.

## 2020-01-24 NOTE — PROCEDURE
[Bone Marrow Biopsy] : bone marrow biopsy [Bone Marrow Aspiration] : bone marrow aspiration  [Patient] : the patient [Verbal Consent Obtained] : verbal consent was obtained prior to the procedure [Patient identification verified] : patient identification verified [Procedure verified and consent obtained] : procedure verified and consent obtained [Laterality verified and correct site marked] : laterality verified and correct site marked [Left] : site: left [Correct positioning] : correct positioning [Prone] : prone [Superior iliac spine was identified] : the superior iliac spine was identified. [The left posterior iliac crest was prepped with betadine and draped, using sterile technique.] : The left posterior iliac crest was prepped with betadine and draped, using sterile technique. [Lidocaine was injected and into the periosteum overlying the site.] : Lidocaine was injected and into the periosteum overlying the site. [Aspirate] : aspirate [Cytogenetics] : cytogenetics [Biopsy] : biopsy [FISH] : FISH [Flow Cytometry] : flow cytometry [] : The patient was instructed to remove the bandage the following AM. The patient may bathe. Acetaminophen may be taken for discomfort, as per package directions.If there are any other problems, the patient was instructed to call the office. The patient verbalized understanding, and is aware of the office contact numbers. [FreeTextEntry1] : diffuse large B cell lymphoma

## 2020-01-24 NOTE — HISTORY OF PRESENT ILLNESS
[0 - No Distress] : Distress Level: 0 [de-identified] : 74 yo woman with PMHx of HLP, IBS,  referred for evaluation of diffuse large B cell lymphoma. \par \par Pt complaint of  left  anterior forehead mass for several years since hitting forehead with door and states has continued to increase in size and has discomfort. denies drainage or infection. Patient developed  during the summer time increase in pressure headaches everyday and also noticed blurry vision. Denies N/V. \par \par CT scan of the head 11/6/19: c/w there is mild soft tissue prominence deep to the marker placed over the left inferior frontal scalp without a discrete mass or fluid collection.\par 10/11/2019- MRI of the brain: Left frontal calvarial / sphenoid bone lesion with extracalvarial and dural involvement suspected.  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 Ki-67 50-60%. Diffuse large B cell lymphoma can not be rule out. FISH for MYC, BCL2 and BCL6 are pending. \par Immunohistochemical stains CD20, PAX5, CD10, BCL6, BCL2 ( weak), CD23,  c-MYC (20%)\par CD 30 few cells.\par Negative CD5, cyclin D1, P53. \par CD3 stain highlights small T cells in the background.\par CD21 stain highlight few residual follicular dendritic cell meshwork. \par EBV encoded RNA negative. \par \par Bone marrow biopsy 11/21/19 negative. \par \par PET/CT 11/26/19 \par \par 1. FDG avid left frontal scalp soft tissue thickening, consistent with biopsy-proven lymphoma. \par Underlying marrow signal abnormality seen on MRI is not definitively appreciated on PET/CT. \par 2. Minimally FDG avid 0.5 cm probable lymph node left parotid tail. \par 3. No FDG avid disease below the diaphragm.\par \par \par 12/19/19- S/P first cycle of R-CHOP complicated by mucositis, thrush and generalized weakness.  [de-identified] : \par 12/19/19- S/P first cycle of R-CHOP complicated by grade 2-3  mucositis, thrush and generalized weakness. \par 1/9/2020- Cycle # 2 Mini-CHOP, tolerated well.  \par \par Patient is feeling better, tolerated chemotherapy well. \par \par \par No other changes in her medical, surgical or social history since 1/2/20.

## 2020-01-27 ENCOUNTER — APPOINTMENT (OUTPATIENT)
Dept: HEMATOLOGY ONCOLOGY | Facility: CLINIC | Age: 75
End: 2020-01-27

## 2020-01-27 LAB
ALBUMIN SERPL ELPH-MCNC: 4.2 G/DL
ALP BLD-CCNC: 115 U/L
ALT SERPL-CCNC: 11 U/L
ANION GAP SERPL CALC-SCNC: 12 MMOL/L
AST SERPL-CCNC: 13 U/L
BILIRUB SERPL-MCNC: 0.2 MG/DL
BUN SERPL-MCNC: 15 MG/DL
CALCIUM SERPL-MCNC: 9.5 MG/DL
CHLORIDE SERPL-SCNC: 101 MMOL/L
CO2 SERPL-SCNC: 24 MMOL/L
CREAT SERPL-MCNC: 0.69 MG/DL
GLUCOSE SERPL-MCNC: 95 MG/DL
LDH SERPL-CCNC: 224 U/L
POTASSIUM SERPL-SCNC: 4 MMOL/L
PROT SERPL-MCNC: 6.4 G/DL
SODIUM SERPL-SCNC: 137 MMOL/L

## 2020-01-30 ENCOUNTER — LABORATORY RESULT (OUTPATIENT)
Age: 75
End: 2020-01-30

## 2020-01-30 ENCOUNTER — APPOINTMENT (OUTPATIENT)
Dept: INFUSION THERAPY | Facility: HOSPITAL | Age: 75
End: 2020-01-30

## 2020-01-30 ENCOUNTER — RESULT REVIEW (OUTPATIENT)
Age: 75
End: 2020-01-30

## 2020-01-30 LAB
BASOPHILS # BLD AUTO: 0.1 K/UL — SIGNIFICANT CHANGE UP (ref 0–0.2)
BASOPHILS NFR BLD AUTO: 0.8 % — SIGNIFICANT CHANGE UP (ref 0–2)
EOSINOPHIL # BLD AUTO: 0.3 K/UL — SIGNIFICANT CHANGE UP (ref 0–0.5)
EOSINOPHIL NFR BLD AUTO: 2.4 % — SIGNIFICANT CHANGE UP (ref 0–6)
HCT VFR BLD CALC: 35.1 % — SIGNIFICANT CHANGE UP (ref 34.5–45)
HGB BLD-MCNC: 12 G/DL — SIGNIFICANT CHANGE UP (ref 11.5–15.5)
LYMPHOCYTES # BLD AUTO: 1.9 K/UL — SIGNIFICANT CHANGE UP (ref 1–3.3)
LYMPHOCYTES # BLD AUTO: 15 % — SIGNIFICANT CHANGE UP (ref 13–44)
MCHC RBC-ENTMCNC: 29.8 PG — SIGNIFICANT CHANGE UP (ref 27–34)
MCHC RBC-ENTMCNC: 34.2 G/DL — SIGNIFICANT CHANGE UP (ref 32–36)
MCV RBC AUTO: 87.3 FL — SIGNIFICANT CHANGE UP (ref 80–100)
MONOCYTES # BLD AUTO: 1.2 K/UL — HIGH (ref 0–0.9)
MONOCYTES NFR BLD AUTO: 9.7 % — SIGNIFICANT CHANGE UP (ref 2–14)
NEUTROPHILS # BLD AUTO: 9.1 K/UL — HIGH (ref 1.8–7.4)
NEUTROPHILS NFR BLD AUTO: 72 % — SIGNIFICANT CHANGE UP (ref 43–77)
PLATELET # BLD AUTO: 358 K/UL — SIGNIFICANT CHANGE UP (ref 150–400)
RBC # BLD: 4.02 M/UL — SIGNIFICANT CHANGE UP (ref 3.8–5.2)
RBC # FLD: 14.8 % — HIGH (ref 10.3–14.5)
WBC # BLD: 12.6 K/UL — HIGH (ref 3.8–10.5)
WBC # FLD AUTO: 12.6 K/UL — HIGH (ref 3.8–10.5)

## 2020-01-31 DIAGNOSIS — Z51.11 ENCOUNTER FOR ANTINEOPLASTIC CHEMOTHERAPY: ICD-10-CM

## 2020-01-31 DIAGNOSIS — Z51.89 ENCOUNTER FOR OTHER SPECIFIED AFTERCARE: ICD-10-CM

## 2020-01-31 DIAGNOSIS — R11.2 NAUSEA WITH VOMITING, UNSPECIFIED: ICD-10-CM

## 2020-02-18 ENCOUNTER — RESULT REVIEW (OUTPATIENT)
Age: 75
End: 2020-02-18

## 2020-02-18 ENCOUNTER — APPOINTMENT (OUTPATIENT)
Dept: HEMATOLOGY ONCOLOGY | Facility: CLINIC | Age: 75
End: 2020-02-18
Payer: MEDICARE

## 2020-02-18 VITALS
HEART RATE: 66 BPM | TEMPERATURE: 98 F | WEIGHT: 106.46 LBS | BODY MASS INDEX: 20.12 KG/M2 | DIASTOLIC BLOOD PRESSURE: 73 MMHG | RESPIRATION RATE: 17 BRPM | OXYGEN SATURATION: 100 % | SYSTOLIC BLOOD PRESSURE: 117 MMHG

## 2020-02-18 LAB
ALBUMIN SERPL ELPH-MCNC: 4.3 G/DL
ALP BLD-CCNC: 81 U/L
ALT SERPL-CCNC: 10 U/L
ANION GAP SERPL CALC-SCNC: 11 MMOL/L
AST SERPL-CCNC: 13 U/L
BASOPHILS # BLD AUTO: 0.1 K/UL — SIGNIFICANT CHANGE UP (ref 0–0.2)
BASOPHILS NFR BLD AUTO: 1.4 % — SIGNIFICANT CHANGE UP (ref 0–2)
BILIRUB SERPL-MCNC: 0.2 MG/DL
BUN SERPL-MCNC: 9 MG/DL
CALCIUM SERPL-MCNC: 9.3 MG/DL
CHLORIDE SERPL-SCNC: 105 MMOL/L
CO2 SERPL-SCNC: 26 MMOL/L
CREAT SERPL-MCNC: 0.9 MG/DL
EOSINOPHIL # BLD AUTO: 0.2 K/UL — SIGNIFICANT CHANGE UP (ref 0–0.5)
EOSINOPHIL NFR BLD AUTO: 1.6 % — SIGNIFICANT CHANGE UP (ref 0–6)
GLUCOSE SERPL-MCNC: 86 MG/DL
HCT VFR BLD CALC: 33.8 % — LOW (ref 34.5–45)
HGB BLD-MCNC: 11.6 G/DL — SIGNIFICANT CHANGE UP (ref 11.5–15.5)
LDH SERPL-CCNC: 173 U/L
LYMPHOCYTES # BLD AUTO: 1.6 K/UL — SIGNIFICANT CHANGE UP (ref 1–3.3)
LYMPHOCYTES # BLD AUTO: 15.9 % — SIGNIFICANT CHANGE UP (ref 13–44)
MCHC RBC-ENTMCNC: 30.5 PG — SIGNIFICANT CHANGE UP (ref 27–34)
MCHC RBC-ENTMCNC: 34.4 G/DL — SIGNIFICANT CHANGE UP (ref 32–36)
MCV RBC AUTO: 88.6 FL — SIGNIFICANT CHANGE UP (ref 80–100)
MONOCYTES # BLD AUTO: 1.1 K/UL — HIGH (ref 0–0.9)
MONOCYTES NFR BLD AUTO: 11.2 % — SIGNIFICANT CHANGE UP (ref 2–14)
NEUTROPHILS # BLD AUTO: 7.1 K/UL — SIGNIFICANT CHANGE UP (ref 1.8–7.4)
NEUTROPHILS NFR BLD AUTO: 69.9 % — SIGNIFICANT CHANGE UP (ref 43–77)
PLATELET # BLD AUTO: 254 K/UL — SIGNIFICANT CHANGE UP (ref 150–400)
POTASSIUM SERPL-SCNC: 3.6 MMOL/L
PROT SERPL-MCNC: 6.1 G/DL
RBC # BLD: 3.81 M/UL — SIGNIFICANT CHANGE UP (ref 3.8–5.2)
RBC # FLD: 14.9 % — HIGH (ref 10.3–14.5)
SODIUM SERPL-SCNC: 141 MMOL/L
WBC # BLD: 10.1 K/UL — SIGNIFICANT CHANGE UP (ref 3.8–10.5)
WBC # FLD AUTO: 10.1 K/UL — SIGNIFICANT CHANGE UP (ref 3.8–10.5)

## 2020-02-18 PROCEDURE — 99214 OFFICE O/P EST MOD 30 MIN: CPT

## 2020-02-18 NOTE — HISTORY OF PRESENT ILLNESS
[de-identified] : 72 yo woman with PMHx of HLP, IBS,  referred for evaluation of diffuse large B cell lymphoma. \par \par Pt complaint of  left  anterior forehead mass for several years since hitting forehead with door and states has continued to increase in size and has discomfort. denies drainage or infection. Patient developed  during the summer time increase in pressure headaches everyday and also noticed blurry vision. Denies N/V. \par \par CT scan of the head 11/6/19: c/w there is mild soft tissue prominence deep to the marker placed over the left inferior frontal scalp without a discrete mass or fluid collection.\par 10/11/2019- MRI of the brain: Left frontal calvarial / sphenoid bone lesion with extracalvarial and dural involvement suspected.  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 Ki-67 50-60%. Diffuse large B cell lymphoma can not be rule out. FISH for MYC, BCL2 and BCL6 are pending. \par Immunohistochemical stains CD20, PAX5, CD10, BCL6, BCL2 ( weak), CD23,  c-MYC (20%)\par CD 30 few cells.\par Negative CD5, cyclin D1, P53. \par CD3 stain highlights small T cells in the background.\par CD21 stain highlight few residual follicular dendritic cell meshwork. \par EBV encoded RNA negative. \par \par Bone marrow biopsy 11/21/19 negative. \par \par PET/CT 11/26/19 \par \par 1. FDG avid left frontal scalp soft tissue thickening, consistent with biopsy-proven lymphoma. \par Underlying marrow signal abnormality seen on MRI is not definitively appreciated on PET/CT. \par 2. Minimally FDG avid 0.5 cm probable lymph node left parotid tail. \par 3. No FDG avid disease below the diaphragm.\par \par \par 12/19/19- S/P first cycle of R-CHOP complicated by mucositis, thrush and generalized weakness.  [de-identified] : \par 12/19/19- S/P first cycle of R-CHOP complicated by grade 2-3  mucositis, thrush and generalized weakness. \par 1/9/2020- Cycle # 2 Mini-CHOP, tolerated well.\par S/P cycle # 3 of Mini-CHOP on 1/30/2020.   \par Third cycle was complicated by grade three mucositis, decrease appetite, not able to drink fluids, similar to S/E with R-CHOP first cycle. \par \par \par No other changes in her medical, surgical or social history since 1/24/20.

## 2020-02-18 NOTE — ASSESSMENT
[FreeTextEntry1] : \par 74 yo woman with PMHx of HLP, IBS, diagnosed with  diffuse large B cell lymphoma localized to the scalp. \par \par CT scan of the head 11/6/19: c/w there is mild soft tissue prominence deep to the marker placed over the left inferior frontal scalp without a discrete mass or fluid collection.\par 10/11/2019- MRI of the brain: Left frontal calvarial / sphenoid bone lesion with extracalvarial and dural involvement suspected. 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 Ki-67 50-60%. Diffuse large B cell lymphoma can not be rule out. FISH for MYC, BCL2 and BCL6 are pending. \par Immunohistochemical stains CD20, PAX5, CD10, BCL6, BCL2 ( weak), CD23, c-MYC (20%)\par CD 30 few cells.\par Negative CD5, cyclin D1, P53. \par CD3 stain highlights small T cells in the background.\par CD21 stain highlight few residual follicular dendritic cell meshwork. \par EBV encoded RNA negative. \par \par Bone marrow biopsy 11/21/19 negative. \par \par PET/CT 11/26/19 \par \par 1. FDG avid left frontal scalp soft tissue thickening, consistent with biopsy-proven lymphoma. \par Underlying marrow signal abnormality seen on MRI is not definitively appreciated on PET/CT. \par 2. Minimally FDG avid 0.5 cm probable lymph node left parotid tail. \par 3. No FDG avid disease below the diaphragm.\par \par Patient had an echocardiogram done 12/11/19  EF 70%\par \par 12/19/19- S/P first cycle of R-CHOP complicated by grade 2-3  mucositis, thrush and generalized weakness. \par 1/9/2020- Cycle # 2 Mini-CHOP, tolerated well.  \par S/P cycle # 3 of Mini-CHOP on 1/30/2020. Third cycle was complicated by grade three mucositis, decrease appetite, not able to drink fluids, similar to S/E with R-CHOP first cycle. \par \par Will hold cycle #4- Mini-CHOP.\par \par Patient will be scheduled for a PET/CT and radiation oncology consultation. \par \par \par RTC 1 month. \par

## 2020-02-20 ENCOUNTER — FORM ENCOUNTER (OUTPATIENT)
Age: 75
End: 2020-02-20

## 2020-02-20 ENCOUNTER — APPOINTMENT (OUTPATIENT)
Dept: INFUSION THERAPY | Facility: HOSPITAL | Age: 75
End: 2020-02-20

## 2020-02-21 ENCOUNTER — OUTPATIENT (OUTPATIENT)
Dept: OUTPATIENT SERVICES | Facility: HOSPITAL | Age: 75
LOS: 1 days | End: 2020-02-21

## 2020-02-21 ENCOUNTER — APPOINTMENT (OUTPATIENT)
Dept: NUCLEAR MEDICINE | Facility: CLINIC | Age: 75
End: 2020-02-21
Payer: MEDICARE

## 2020-02-21 DIAGNOSIS — C83.39 DIFFUSE LARGE B-CELL LYMPHOMA, EXTRANODAL AND SOLID ORGAN SITES: ICD-10-CM

## 2020-02-21 PROCEDURE — 78815 PET IMAGE W/CT SKULL-THIGH: CPT | Mod: 26,PS

## 2020-02-26 ENCOUNTER — APPOINTMENT (OUTPATIENT)
Dept: NUCLEAR MEDICINE | Facility: CLINIC | Age: 75
End: 2020-02-26

## 2020-03-11 ENCOUNTER — APPOINTMENT (OUTPATIENT)
Dept: RADIATION ONCOLOGY | Facility: CLINIC | Age: 75
End: 2020-03-11
Payer: MEDICARE

## 2020-03-11 ENCOUNTER — OUTPATIENT (OUTPATIENT)
Dept: OUTPATIENT SERVICES | Facility: HOSPITAL | Age: 75
LOS: 1 days | Discharge: HOME | End: 2020-03-11
Payer: MEDICARE

## 2020-03-11 VITALS
TEMPERATURE: 98.1 F | BODY MASS INDEX: 19.46 KG/M2 | WEIGHT: 103 LBS | DIASTOLIC BLOOD PRESSURE: 69 MMHG | OXYGEN SATURATION: 98 % | SYSTOLIC BLOOD PRESSURE: 119 MMHG | HEART RATE: 81 BPM | RESPIRATION RATE: 16 BRPM

## 2020-03-11 DIAGNOSIS — R22.0 LOCALIZED SWELLING, MASS AND LUMP, HEAD: ICD-10-CM

## 2020-03-11 PROCEDURE — 99203 OFFICE O/P NEW LOW 30 MIN: CPT | Mod: GC,25

## 2020-03-11 RX ORDER — METOCLOPRAMIDE 10 MG/1
10 TABLET ORAL EVERY 6 HOURS
Qty: 45 | Refills: 1 | Status: DISCONTINUED | COMMUNITY
Start: 2019-12-19 | End: 2020-03-11

## 2020-03-11 RX ORDER — FLUCONAZOLE 100 MG/1
100 TABLET ORAL DAILY
Qty: 14 | Refills: 1 | Status: DISCONTINUED | COMMUNITY
Start: 2019-12-26 | End: 2020-03-11

## 2020-03-11 RX ORDER — PREDNISONE 50 MG/1
50 TABLET ORAL
Qty: 60 | Refills: 0 | Status: DISCONTINUED | COMMUNITY
Start: 2019-12-11 | End: 2020-03-11

## 2020-03-11 RX ORDER — ONDANSETRON 4 MG/1
4 TABLET ORAL
Qty: 45 | Refills: 0 | Status: DISCONTINUED | COMMUNITY
Start: 2019-12-11 | End: 2020-03-11

## 2020-03-11 RX ORDER — OXYCODONE AND ACETAMINOPHEN 2.5; 325 MG/1; MG/1
2.5-325 TABLET ORAL EVERY 6 HOURS
Qty: 6 | Refills: 0 | Status: DISCONTINUED | COMMUNITY
Start: 2019-11-21 | End: 2020-03-11

## 2020-03-11 RX ORDER — OMEPRAZOLE 40 MG/1
40 CAPSULE, DELAYED RELEASE ORAL
Qty: 30 | Refills: 6 | Status: DISCONTINUED | COMMUNITY
Start: 2019-12-26 | End: 2020-03-11

## 2020-03-11 NOTE — VITALS
[Maximal Pain Intensity: 8/10] : 8/10 [Least Pain Intensity: 3/10] : 3/10 [Pain Description/Quality: ___] : Pain description/quality: [unfilled] [Pain Duration: ___] : Pain duration: [unfilled] [Pain Location: ___] : Pain Location: [unfilled] [80: Normal activity with effort; some signs or symptoms of disease.] : 80: Normal activity with effort; some signs or symptoms of disease.  [ECOG Performance Status: 1 - Restricted in physically strenuous activity but ambulatory and able to carry out work of a light or sedentary nature] : Performance Status: 1 - Restricted in physically strenuous activity but ambulatory and able to carry out work of a light or sedentary nature, e.g., light house work, office work

## 2020-03-12 ENCOUNTER — APPOINTMENT (OUTPATIENT)
Dept: INFUSION THERAPY | Facility: HOSPITAL | Age: 75
End: 2020-03-12

## 2020-03-13 PROBLEM — R22.0 MASS OF SCALP: Status: RESOLVED | Noted: 2019-09-26 | Resolved: 2020-03-13

## 2020-03-13 NOTE — OB/GYN HISTORY
[Definite:  ___ (Date)] : the last menstrual period was [unfilled] [___] : Living: [unfilled] [History of Hormone Replacement Therapy] : no history of hormone replacement therapy [Currently In Menopause] : not currently in menopause [Experiencing Menopausal Sxs] : not experiencing menopausal symptoms

## 2020-03-13 NOTE — REVIEW OF SYSTEMS
[Night Sweats] : night sweats [Fatigue] : fatigue [Urinary Frequency] : urinary frequency [Easy Bleeding] : a tendency for easy bleeding [Negative] : Endocrine [Constipation: Grade 1 - Occasional or intermittent symptoms; occasional use of stool softeners, laxatives, dietary modification, or enema] : Constipation: Grade 1 - Occasional or intermittent symptoms; occasional use of stool softeners, laxatives, dietary modification, or enema [Fatigue: Grade 1 - Fatigue relieved by rest] : Fatigue: Grade 1 - Fatigue relieved by rest [Urinary Frequency: Grade 1 - Present] : Urinary Frequency: Grade 1 - Present [Patient Intake Form Reviewed] : Patient intake form was reviewed [Loss of Hearing] : loss of hearing [FreeTextEntry6] : borderline COPD [FreeTextEntry7] : IBS-c [FreeTextEntry2] : IBS

## 2020-03-13 NOTE — PHYSICAL EXAM
[Sclera] : the sclera and conjunctiva were normal [Outer Ear] : the ears and nose were normal in appearance [] : no respiratory distress [Arterial Pulses Normal] : the arterial pulses were normal [Normal] : oriented to person, place and time, the affect was normal, the mood was normal and not anxious [Cervical Lymph Nodes Enlarged Anterior Bilaterally] : anterior cervical [Supraclavicular Lymph Nodes Enlarged Bilaterally] : supraclavicular [de-identified] : xerostomia,no residual palpable mass [de-identified] : allopecia. No palpable masses

## 2020-03-13 NOTE — HISTORY OF PRESENT ILLNESS
[FreeTextEntry1] : 74 yo woman with [place] Stage IE diffuse large B cell lymphoma of the left scalp with calvarial and dural involvement s/p R-CHOP x 1 and mini-CHOP x2 with CR.  \par \par Pt complained of left anterior forehead mass for several years since hitting forehead with door and statesthis continued to increase in size,accompanied by discomfort.  there was no  drainage or infection. . During the summer time,she noted an increase in pressure headaches everyday and also noticed blurry vision.without nausea/vomitting.  This prompted work up.. \par \par CT brain 10/1/19 showed sclerotic changes involving left frontal calvarium with both extracaalvarial and intracranial soft tissue extension. \par \par 10/11/2019- MRI of the brain: Left frontal calvarial / sphenoid bone lesion with extracalvarial and dural involvement suspected. \par \par CT head 11/6/19 showed mild nonspecific soft tissue prominence deep to the marker placed over the left inferior frontal scalp. without a discrete mass or fluid collection.\par \par 11/19/19 she underwent U/S guided biopsy.  Pathology showed B cell lymphoma of follicle center origin with increased number of large cells. Ki-67 50-60%. Diffuse large B cell lymphoma can not be rule out. Immunohistochemical stains CD20, PAX5, CD10, BCL6, BCL2 (weak), CD23, c-MYC (20%)  With this result she was referred to Dr. Sanderson.who completed the work up with:\par \par Bone marrow biopsy 11/21/19 negative. \par \par PET/CT 11/26/19 showed  FDG avid left frontal scalp soft tissue thickening, consistent with biopsy-proven lymphoma. Underlying marrow signal abnormality seen on MRI is not definitively appreciated on PET/CT.  Minimally FDG avid 0.5 cm probable lymph node left parotid tail. \par \par She then began systemic therapy.\par 12/19/19- S/P first cycle of R-CHOP complicated by mucositis, thrush and generalized weakness. R-Chop was switched to mini-CHOP and she tolerated cycle 2 well but developed grade three mucositis, difficulty with drinking, and decreased appetite. Cycle 3 was given on 1/30/2020. Per 2/18/2020 Dr. Sanderson's note, will hold off on Cycle 4\par \par PET scan 2/21/2020 showed completion resolution of left frontal scalp soft tissue thickening consistent with complete metabolic response ([place] 1), resolution of left intraparotid focus. There is small focus of air in the endometrial cavity that is slightly more conspicuous compared to the prior exam for which clinical correlation is suggested.\par \par When seen today, she is doing well. She still notes xerostomia and dysgeusia from the chemotherapy. States her appetite is poor but she is maintaining her weight. She continues to have IBS and bowel pain and discomfort. 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 No history of RT. She lives in [facility] and does not mind driving to [facility] for care

## 2020-03-16 PROCEDURE — 77334 RADIATION TREATMENT AID(S): CPT | Mod: 26

## 2020-03-16 PROCEDURE — 77290 THER RAD SIMULAJ FIELD CPLX: CPT | Mod: 26

## 2020-03-20 ENCOUNTER — OUTPATIENT (OUTPATIENT)
Dept: OUTPATIENT SERVICES | Facility: HOSPITAL | Age: 75
LOS: 1 days | Discharge: HOME | End: 2020-03-20

## 2020-03-20 DIAGNOSIS — C85.90 NON-HODGKIN LYMPHOMA, UNSPECIFIED, UNSPECIFIED SITE: ICD-10-CM

## 2020-03-25 PROCEDURE — 77321 SPECIAL TELETX PORT PLAN: CPT | Mod: 26

## 2020-03-25 PROCEDURE — 77334 RADIATION TREATMENT AID(S): CPT | Mod: 26

## 2020-03-26 ENCOUNTER — APPOINTMENT (OUTPATIENT)
Dept: HEMATOLOGY ONCOLOGY | Facility: CLINIC | Age: 75
End: 2020-03-26

## 2020-03-31 PROCEDURE — 77280 THER RAD SIMULAJ FIELD SMPL: CPT | Mod: 26

## 2020-04-01 PROCEDURE — 77427 RADIATION TX MANAGEMENT X5: CPT

## 2020-04-02 ENCOUNTER — APPOINTMENT (OUTPATIENT)
Dept: INFUSION THERAPY | Facility: HOSPITAL | Age: 75
End: 2020-04-02

## 2020-04-07 DIAGNOSIS — B37.0 CANDIDAL STOMATITIS: ICD-10-CM

## 2020-04-07 NOTE — HISTORY OF PRESENT ILLNESS
[FreeTextEntry1] : 74 yo woman with [place] Stage IE diffuse large B cell lymphoma of the left scalp with calvarial and dural involvement s/p R-CHOP x 1 and mini-CHOP x2 with CR on PET.\par \par 4/7/2020;Today she is feeling well. Denies any pain. No skin changes. Is using Aquaphor as needed. Reports thrush which can be bothersome at times dependent of food. She is having some anxiety with daily treatment.  On exam, no skin changes No ovious thrush in the mouth

## 2020-04-07 NOTE — DISEASE MANAGEMENT
[Clinical] : TNM Stage: c [N/A] : Currently not applicable [TTNM] : x [NTNM] : x [MTNM] : x [de-identified] : 900 [de-identified] : 8284 [de-identified] : left scalp

## 2020-04-08 PROCEDURE — 77427 RADIATION TX MANAGEMENT X5: CPT

## 2020-04-14 VITALS
TEMPERATURE: 97.8 F | WEIGHT: 105.71 LBS | HEART RATE: 62 BPM | OXYGEN SATURATION: 97 % | DIASTOLIC BLOOD PRESSURE: 69 MMHG | BODY MASS INDEX: 19.97 KG/M2 | SYSTOLIC BLOOD PRESSURE: 128 MMHG | RESPIRATION RATE: 16 BRPM

## 2020-04-14 NOTE — DISEASE MANAGEMENT
[Clinical] : TNM Stage: c [N/A] : Currently not applicable [TTNM] : x [NTNM] : x [MTNM] : x [de-identified] : 4278 [de-identified] : 8948 [de-identified] : left scalp

## 2020-04-14 NOTE — HISTORY OF PRESENT ILLNESS
[FreeTextEntry1] : 72 yo woman with [place] Stage IE diffuse large B cell lymphoma of the left scalp with calvarial and dural involvement s/p R-CHOP x 1 and mini-CHOP x2 with CR on PET.\par \par 4/14/2020: Today she is feeling well however, reports tooth ache. No skin changes. Mild pruritus.Is using Aquaphor daily.

## 2020-04-14 NOTE — REVIEW OF SYSTEMS
[Fatigue: Grade 0] : Fatigue: Grade 0 [Pruritus: Grade 1 - Mild or localized; topical intervention indicated] : Pruritus: Grade 1 - Mild or localized; topical intervention indicated [Skin Hyperpigmentation: Grade 0] : Skin Hyperpigmentation: Grade 0 [Dermatitis Radiation: Grade 0] : Dermatitis Radiation: Grade 0

## 2020-04-14 NOTE — VITALS
[Maximal Pain Intensity: 6/10] : 6/10 [Least Pain Intensity: 6/10] : 6/10 [Pain Description/Quality: ___] : Pain description/quality: [unfilled] [Pain Duration: ___] : Pain duration: [unfilled] [Pain Location: ___] : Pain Location: [unfilled] [80: Normal activity with effort; some signs or symptoms of disease.] : 80: Normal activity with effort; some signs or symptoms of disease.  [ECOG Performance Status: 1 - Restricted in physically strenuous activity but ambulatory and able to carry out work of a light or sedentary nature] : Performance Status: 1 - Restricted in physically strenuous activity but ambulatory and able to carry out work of a light or sedentary nature, e.g., light house work, office work

## 2020-04-15 PROCEDURE — 77427 RADIATION TX MANAGEMENT X5: CPT

## 2020-04-21 VITALS
WEIGHT: 104.72 LBS | DIASTOLIC BLOOD PRESSURE: 71 MMHG | TEMPERATURE: 98.1 F | RESPIRATION RATE: 16 BRPM | SYSTOLIC BLOOD PRESSURE: 100 MMHG | HEART RATE: 63 BPM | BODY MASS INDEX: 19.79 KG/M2 | OXYGEN SATURATION: 97 %

## 2020-04-21 NOTE — REVIEW OF SYSTEMS
[Fatigue: Grade 0] : Fatigue: Grade 0 [Pruritus: Grade 1 - Mild or localized; topical intervention indicated] : Pruritus: Grade 1 - Mild or localized; topical intervention indicated [Skin Hyperpigmentation: Grade 0] : Skin Hyperpigmentation: Grade 0 [Dermatitis Radiation: Grade 1 - Faint erythema or dry desquamation] : Dermatitis Radiation: Grade 1 - Faint erythema or dry desquamation

## 2020-05-05 NOTE — HISTORY OF PRESENT ILLNESS
[FreeTextEntry1] : 72 yo woman with [place] Stage IE diffuse large B cell lymphoma of the left scalp with calvarial and dural involvement s/p R-CHOP x 1 and mini-CHOP x2 with CR on PET.\par \par 4/21Today she is feeling well. Mild erythema noted to scalp. +. Mild pruritus.Is using Aquaphor and hydrocortisone

## 2020-05-26 ENCOUNTER — APPOINTMENT (OUTPATIENT)
Dept: RADIATION ONCOLOGY | Facility: CLINIC | Age: 75
End: 2020-05-26
Payer: MEDICARE

## 2020-05-26 PROCEDURE — 99024 POSTOP FOLLOW-UP VISIT: CPT

## 2020-05-26 NOTE — VITALS
[Least Pain Intensity: 0/10] : 0/10 [Maximal Pain Intensity: 6/10] : 6/10 [Pain Description/Quality: ___] : Pain description/quality: [unfilled] [Pain Duration: ___] : Pain duration: [unfilled] [Pain Location: ___] : Pain Location: [unfilled] [NoTreatment Scheduled] : no treatment scheduled [80: Normal activity with effort; some signs or symptoms of disease.] : 80: Normal activity with effort; some signs or symptoms of disease.  [ECOG Performance Status: 1 - Restricted in physically strenuous activity but ambulatory and able to carry out work of a light or sedentary nature] : Performance Status: 1 - Restricted in physically strenuous activity but ambulatory and able to carry out work of a light or sedentary nature, e.g., light house work, office work

## 2020-05-26 NOTE — REVIEW OF SYSTEMS
[Nausea: Grade 1 - Loss of appetite without alteration in eating habits] : Nausea: Grade 1 - Loss of appetite without alteration in eating habits [Fatigue: Grade 1 - Fatigue relieved by rest] : Fatigue: Grade 1 - Fatigue relieved by rest [Anxiety: Grade 1 - Mild symptoms; intervention not indicated] : Anxiety: Grade 1 - Mild symptoms; intervention not indicated [Alopecia: Grade 1 - Hair loss of <50% of normal for that individual that is not obvious from a distance but only on close inspection; a different hair style may be required to cover the hair loss but it does not require a wig or hair piece to camouflage] : Alopecia: Grade 1 - Hair loss of <50% of normal for that individual that is not obvious from a distance but only on close inspection; a different hair style may be required to cover the hair loss but it does not require a wig or hair piece to camouflage [Pruritus: Grade 0] : Pruritus: Grade 0 [Skin Hyperpigmentation: Grade 0] : Skin Hyperpigmentation: Grade 0 [Dermatitis Radiation: Grade 0] : Dermatitis Radiation: Grade 0

## 2020-05-28 NOTE — HISTORY OF PRESENT ILLNESS
[Home] : at home, [unfilled] , at the time of the visit. [Medical Office: ([facility])___] : at the medical office located in  [Verbal consent obtained from patient] : the patient, [unfilled] [FreeTextEntry1] : \par Christy Cantor is a 74 year old female with [place] Stage IE DLBCL of the left scalp with calvarial and dural involvement s/p R-CHOP x 1 and mini CHOP x 2 with CR on PE. She received radiation to the left scalp to a total dose of 3060 cGy in 17 fractions. Treatment was delivered from 4/1/2020 - 4/23/2020. She tolerated her radiation well. She did not develop any grade 3 or higher acute toxicities as a result of the treatment. \par \par Sheis visited today for a post treatment evaluation. Reports continued fatigue. Is trying to increase activity. She reports GI issues with occasional nausea and difficulty emptying the bowels.. No vomiting. She takes Zofran 4mg prn. Appetite has decreased. Reports dysgeusia. Reports lower abdominal pain throughout the day.  and she is having multiple bowels with rectal pressure. Warm compresses have helped slightly. She reports increased stress due to the current COVID situation. Shehas history of spastic colon, IBS  for which she had been using Linzess but has not been using this for months.\par

## 2020-06-04 ENCOUNTER — TRANSCRIPTION ENCOUNTER (OUTPATIENT)
Age: 75
End: 2020-06-04

## 2020-07-06 ENCOUNTER — OUTPATIENT (OUTPATIENT)
Dept: OUTPATIENT SERVICES | Facility: HOSPITAL | Age: 75
LOS: 1 days | Discharge: HOME | End: 2020-07-06

## 2020-07-06 DIAGNOSIS — C85.90 NON-HODGKIN LYMPHOMA, UNSPECIFIED, UNSPECIFIED SITE: ICD-10-CM

## 2020-07-07 ENCOUNTER — APPOINTMENT (OUTPATIENT)
Dept: HEMATOLOGY ONCOLOGY | Facility: CLINIC | Age: 75
End: 2020-07-07
Payer: MEDICARE

## 2020-07-07 PROCEDURE — 99214 OFFICE O/P EST MOD 30 MIN: CPT | Mod: 95

## 2020-07-07 RX ORDER — AMOXICILLIN AND CLAVULANATE POTASSIUM 875; 125 MG/1; MG/1
875-125 TABLET, COATED ORAL TWICE DAILY
Qty: 14 | Refills: 0 | Status: DISCONTINUED | COMMUNITY
Start: 2020-04-14 | End: 2020-07-07

## 2020-07-07 RX ORDER — FLUCONAZOLE 100 MG/1
100 TABLET ORAL
Qty: 6 | Refills: 0 | Status: DISCONTINUED | COMMUNITY
Start: 2020-04-07 | End: 2020-07-07

## 2020-07-07 NOTE — CONSULT LETTER
[Consult Letter:] : I had the pleasure of evaluating your patient, [unfilled]. [Dear  ___] : Dear  [unfilled], [Please see my note below.] : Please see my note below. [Sincerely,] : Sincerely, [Consult Closing:] : Thank you very much for allowing me to participate in the care of this patient.  If you have any questions, please do not hesitate to contact me. [FreeTextEntry2] : Dr Shan Yan

## 2020-07-07 NOTE — PROCEDURE
[Bone Marrow Biopsy] : bone marrow biopsy [Patient] : the patient [Bone Marrow Aspiration] : bone marrow aspiration  [Procedure verified and consent obtained] : procedure verified and consent obtained [Verbal Consent Obtained] : verbal consent was obtained prior to the procedure [Patient identification verified] : patient identification verified [Left] : site: left [Laterality verified and correct site marked] : laterality verified and correct site marked [Correct positioning] : correct positioning [Prone] : prone [Lidocaine was injected and into the periosteum overlying the site.] : Lidocaine was injected and into the periosteum overlying the site. [The left posterior iliac crest was prepped with betadine and draped, using sterile technique.] : The left posterior iliac crest was prepped with betadine and draped, using sterile technique. [Superior iliac spine was identified] : the superior iliac spine was identified. [Aspirate] : aspirate [Cytogenetics] : cytogenetics [Biopsy] : biopsy [FISH] : FISH [Flow Cytometry] : flow cytometry [] : The patient was instructed to remove the bandage the following AM. The patient may bathe. Acetaminophen may be taken for discomfort, as per package directions.If there are any other problems, the patient was instructed to call the office. The patient verbalized understanding, and is aware of the office contact numbers. [FreeTextEntry1] : diffuse large B cell lymphoma

## 2020-07-07 NOTE — ASSESSMENT
[FreeTextEntry1] : \par 75 yo woman with PMHx of HLP, IBS, diagnosed with  diffuse large B cell lymphoma localized to the scalp. \par \par CT scan of the head 11/6/19: c/w there is mild soft tissue prominence deep to the marker placed over the left inferior frontal scalp without a discrete mass or fluid collection.\par 10/11/2019- MRI of the brain: Left frontal calvarial / sphenoid bone lesion with extracalvarial and dural involvement suspected. 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 Ki-67 50-60%. Diffuse large B cell lymphoma can not be rule out. FISH for MYC, BCL2 and BCL6 are pending. \par Immunohistochemical stains CD20, PAX5, CD10, BCL6, BCL2 ( weak), CD23, c-MYC (20%)\par CD 30 few cells.\par Negative CD5, cyclin D1, P53. \par CD3 stain highlights small T cells in the background.\par CD21 stain highlight few residual follicular dendritic cell meshwork. \par EBV encoded RNA negative. \par \par Bone marrow biopsy 11/21/19 negative. \par \par PET/CT 11/26/19 \par \par 1. FDG avid left frontal scalp soft tissue thickening, consistent with biopsy-proven lymphoma. \par Underlying marrow signal abnormality seen on MRI is not definitively appreciated on PET/CT. \par 2. Minimally FDG avid 0.5 cm probable lymph node left parotid tail. \par 3. No FDG avid disease below the diaphragm.\par \par Patient had an echocardiogram done 12/11/19  EF 70%\par \par 12/19/19- S/P first cycle of R-CHOP complicated by grade 2-3  mucositis, thrush and generalized weakness. \par 1/9/2020- Cycle # 2 Mini-CHOP, tolerated well.\par S/P cycle # 3 of Mini-CHOP on 1/30/2020.   \par Third cycle was complicated by grade three mucositis, decrease appetite, not able to drink fluids, similar to S/E with R-CHOP first cycle. Chemotherapy was discontinued at this time. \par Patient completed radiation therapy 20 treatments on 4/23/2020 with Dr Spencer. \par \par Patient complaints of abdominal discomfort for several weeks, was evaluated by Dr Stephane WASHINGTON and was started on Amitiza. Patient is feeling better. Patient is also on Librax BID ( PRN) with moderate relief. \par \par Will order a PET/CT to evaluate response to treatment and to evaluate GI symptoms to r/o recurrent disease. \par \par This service was provided by using telehealth. The patient was at home and I was at [facility]. The patient requested and participated in this encounter. The encounter face to face last 30   minutes coordinating his/her care and counseling.\par \par \par RTC 3 month. \par

## 2020-07-07 NOTE — REVIEW OF SYSTEMS
[Anxiety] : anxiety [Fatigue] : no fatigue [Abdominal Pain] : abdominal pain [Constipation] : constipation [Negative] : ENT

## 2020-07-07 NOTE — HISTORY OF PRESENT ILLNESS
[0 - No Distress] : Distress Level: 0 [Home] : at home, [unfilled] , at the time of the visit. [Medical Office: ([facility])___] : at the medical office located in  [Verbal consent obtained from patient] : the patient, [unfilled] [de-identified] : 73 yo woman with PMHx of HLP, IBS,  referred for evaluation of diffuse large B cell lymphoma. \par \par Pt complaint of  left  anterior forehead mass for several years since hitting forehead with door and states has continued to increase in size and has discomfort. denies drainage or infection. Patient developed  during the summer time increase in pressure headaches everyday and also noticed blurry vision. Denies N/V. \par \par CT scan of the head 11/6/19: c/w there is mild soft tissue prominence deep to the marker placed over the left inferior frontal scalp without a discrete mass or fluid collection.\par 10/11/2019- MRI of the brain: Left frontal calvarial / sphenoid bone lesion with extracalvarial and dural involvement suspected.  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 Ki-67 50-60%. Diffuse large B cell lymphoma can not be rule out. FISH for MYC, BCL2 and BCL6 are pending. \par Immunohistochemical stains CD20, PAX5, CD10, BCL6, BCL2 ( weak), CD23,  c-MYC (20%)\par CD 30 few cells.\par Negative CD5, cyclin D1, P53. \par CD3 stain highlights small T cells in the background.\par CD21 stain highlight few residual follicular dendritic cell meshwork. \par EBV encoded RNA negative. \par \par Bone marrow biopsy 11/21/19 negative. \par \par PET/CT 11/26/19 \par \par 1. FDG avid left frontal scalp soft tissue thickening, consistent with biopsy-proven lymphoma. \par Underlying marrow signal abnormality seen on MRI is not definitively appreciated on PET/CT. \par 2. Minimally FDG avid 0.5 cm probable lymph node left parotid tail. \par 3. No FDG avid disease below the diaphragm.\par \par \par 12/19/19- S/P first cycle of R-CHOP complicated by mucositis, thrush and generalized weakness.  [de-identified] : \par 12/19/19- S/P first cycle of R-CHOP complicated by grade 2-3  mucositis, thrush and generalized weakness. \par 1/9/2020- Cycle # 2 Mini-CHOP, tolerated well.\par S/P cycle # 3 of Mini-CHOP on 1/30/2020.   \par Third cycle was complicated by grade three mucositis, decrease appetite, not able to drink fluids, similar to S/E with R-CHOP first cycle. \par Patient completed radiation therapy 20 treatments on 4/23/2020. \par \par Patient complaints of abdominal discomfort for several days, was evaluated by Dr Stephane WASHINGTON and was started on Amitiza. Patient is feeling better. Patient is also on Librax BID\par  ( PRN) with moderate relief. \par No other changes in her medical, surgical or social history since 2/18/20.

## 2020-07-13 ENCOUNTER — TRANSCRIPTION ENCOUNTER (OUTPATIENT)
Age: 75
End: 2020-07-13

## 2020-07-15 LAB
BASOPHILS # BLD AUTO: 0.1 K/UL
BASOPHILS NFR BLD AUTO: 0.8 %
EOSINOPHIL # BLD AUTO: 0.26 K/UL
EOSINOPHIL NFR BLD AUTO: 2 %
HCT VFR BLD CALC: 42.2 %
HGB BLD-MCNC: 13.1 G/DL
IMM GRANULOCYTES NFR BLD AUTO: 0.4 %
LYMPHOCYTES # BLD AUTO: 1.6 K/UL
LYMPHOCYTES NFR BLD AUTO: 12.4 %
MAN DIFF?: NORMAL
MCHC RBC-ENTMCNC: 27.1 PG
MCHC RBC-ENTMCNC: 31 GM/DL
MCV RBC AUTO: 87.4 FL
MONOCYTES # BLD AUTO: 1.18 K/UL
MONOCYTES NFR BLD AUTO: 9.1 %
NEUTROPHILS # BLD AUTO: 9.73 K/UL
NEUTROPHILS NFR BLD AUTO: 75.3 %
PLATELET # BLD AUTO: 350 K/UL
RBC # BLD: 4.83 M/UL
RBC # FLD: 15.4 %
WBC # FLD AUTO: 12.92 K/UL

## 2020-07-29 ENCOUNTER — APPOINTMENT (OUTPATIENT)
Dept: NUCLEAR MEDICINE | Facility: CLINIC | Age: 75
End: 2020-07-29
Payer: MEDICARE

## 2020-07-29 ENCOUNTER — OUTPATIENT (OUTPATIENT)
Dept: OUTPATIENT SERVICES | Facility: HOSPITAL | Age: 75
LOS: 1 days | End: 2020-07-29

## 2020-07-29 DIAGNOSIS — C83.89 OTHER NON-FOLLICULAR LYMPHOMA, EXTRANODAL AND SOLID ORGAN SITES: ICD-10-CM

## 2020-07-29 PROCEDURE — 78815 PET IMAGE W/CT SKULL-THIGH: CPT | Mod: 26,PS

## 2020-07-30 ENCOUNTER — TRANSCRIPTION ENCOUNTER (OUTPATIENT)
Age: 75
End: 2020-07-30

## 2020-09-21 NOTE — DISEASE MANAGEMENT
9/21/2020    Mary Alice Colón PA-C  [address] [facility] [id number]    RE: Kassie Ramirez       Dear Colleague,    I had the pleasure of seeing Kassie Ramirez in the [facility].    HPI and Plan:   See dictation    Orders Placed This Encounter   Procedures     Basic metabolic panel     Basic metabolic panel     Follow-Up with [facility] - NICK visit     Follow-Up with [facility] - Return MD visit     CARDIAC REHAB REFERRAL     Echocardiogram Complete       Orders Placed This Encounter   Medications     carvedilol (COREG) 3.125 MG tablet     Sig: Take 1 tablet (3.125 mg) by mouth 2 times daily (with meals)     Dispense:  180 tablet     Refill:  3     lisinopril (ZESTRIL) 2.5 MG tablet     Sig: Take 1 tablet (2.5 mg) by mouth daily     Dispense:  180 tablet     Refill:  3     furosemide (LASIX) 20 MG tablet     Sig: 10 mg daily. Take an additional 10 mg as directed.     Dispense:  180 tablet     Refill:  3       Medications Discontinued During This Encounter   Medication Reason     carvedilol (COREG) 3.125 MG tablet      lisinopril (ZESTRIL) 2.5 MG tablet Reorder     furosemide (LASIX) 20 MG tablet Reorder         Encounter Diagnoses   Name Primary?     Nonischemic cardiomyopathy (H) Yes     Chronic systolic congestive heart failure (H)      Acute systolic congestive heart failure (H)      Secondary cardiomyopathy (H)        CURRENT MEDICATIONS:  Current Outpatient Medications   Medication Sig Dispense Refill     carvedilol (COREG) 3.125 MG tablet Take 1 tablet (3.125 mg) by mouth 2 times daily (with meals) 180 tablet 3     cetirizine (ZYRTEC) 10 MG tablet Take 10 mg by mouth daily       furosemide (LASIX) 20 MG tablet 10 mg daily. Take an additional 10 mg as directed. 180 tablet 3     lisinopril (ZESTRIL) 2.5 MG tablet Take 1 tablet (2.5 mg) by mouth daily 180 tablet 3     LORazepam (ATIVAN) 0.5 MG tablet 1 tablet by mouth at bedtime for sleep and anxiety. 30 tablet  [Clinical] : TNM Stage: c 0     potassium chloride ER (KLOR-CON M) 20 MEQ CR tablet Take 2 tablets (40 mEq) by mouth daily 180 tablet 0     lidocaine-prilocaine (EMLA) 2.5-2.5 % external cream Apply 1 applicator topically as needed for moderate pain         ALLERGIES     Allergies   Allergen Reactions     Cold & Flu [Cold Defense Fighter]      See pseudoephedrine     Seasonal Allergies      Sudafed Cold-Cough [Dayquil Liquicaps]      Pseudoephedrine Rash     Rash then skins peels off        PAST MEDICAL HISTORY:  Past Medical History:   Diagnosis Date     Anxiety attack 9/16/2014     Cardiomyopathy (H)     non ishemic - 25-30% - Chemo related     Diffuse large B-cell lymphoma (H)     Diagnosed 11/2019, Ronan Albarran     Encounter for Essure implantation 2009     Generalized anxiety disorder 9/16/2014    zoloft = flat emotions     HFrEF (heart failure with reduced ejection fraction) (H)     new diagnosis 6/14     Menopausal disorder     started on OCPs by menopause center 3/2017 (takes active continuously)     Menstrual headache     helped by OCPs and magnesium     Paroxysmal SVT (supraventricular tachycardia) (H) 06/2020     GERTRUDE (stress urinary incontinence, female)     sling procedure 2016       PAST SURGICAL HISTORY:  Past Surgical History:   Procedure Laterality Date     CV CORONARY ANGIOGRAM N/A 6/15/2020    Procedure: Coronary Angiogram;  Surgeon: Luis Eduardo Phillips MD;  Location:  HE[facility] CARDIAC CATH LAB     CV LEFT HEART CATH N/A 6/15/2020    Procedure: Left Heart Cath;  Surgeon: Luis Eduardo Phillips MD;  Location:  HE[facility] CARDI[facility] CATH LAB     EP ABLATION SVT N/A 6/22/2020    Procedure: EP Ablation SVT;  Surgeon: Francisco Javier Bynum MD;  Location:  HEART CARDIAC CATH LAB      KIT ESSURE  2009    essure - Dr. Simeon Block      HERNIORRHAPHY UMBILICAL  1974     IR CHEST PORT PLACEMENT > 5 YRS OF AGE  1/9/2020     mediastinoscopy  2019     SLING TRANSPUBO WITHOUT ANTERIOR COLPORRHAPHY N/A 11/21/2016    Procedure: SLING TRANSPUBO  [N/A] : Currently not applicable WITHOUT ANTERIOR COLPORRHAPHY;  Surgeon: Hernesto Berrios MD;  Location: [facility] OR       FAMILY HISTORY:  Family History   Problem Relation Age of Onset     Hypertension Mother      Depression Mother      Lipids Mother      Cardiovascular Mother      Circulatory Mother      Diabetes Mother      Heart Disease Mother      Cerebrovascular Disease Mother      Obesity Mother      C.A.D. 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     Lifestyle     Physical activity     Days per week: None     Minutes per session: None     Stress: None   Relationships     Social connections     Talks on phone: None     Gets together: None     Attends Advent service: None     Active member of club or organization: None     Attends meetings of clubs or organizations: None     Relationship status: None      [TTNM] : x "Intimate partner violence     Fear of current or ex partner: None     Emotionally abused: None     Physically abused: None     Forced sexual activity: None   Other Topics Concern     Parent/sibling w/ CABG, MI or angioplasty before 65F 55M? No      Service Not Asked     Blood Transfusions Not Asked     Caffeine Concern Yes     Comment: MOnster energy drink.   Te - 3 cups.        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        Review of Systems:  Skin:  Negative       Eyes:  Positive for glasses readers  ENT:  Positive for   low \"rumble\" left ear recently  Respiratory:  Negative       Cardiovascular:    fatigue;Positive for occ chest pain  Gastroenterology: Negative      Genitourinary:  Negative      Musculoskeletal:  Negative      Neurologic:  Negative      Psychiatric:  Positive for anxiety treated  Heme/Lymph/Imm:  Positive for allergies seasonal  Endocrine:  Positive for hot flashes;night sweats      Physical Exam:  Vitals: /69   Pulse 103   Ht 1.689 m (5' 6.5\")   Wt 71.3 kg (157 lb 3.2 oz)   LMP 11/06/2019   SpO2 95%   BMI 24.99 kg/m      Constitutional:  cooperative;in no acute distress        Skin:  warm and dry to the touch          Head:  normocephalic        Eyes:  pupils equal and round        Lymph:      ENT:  no pallor or cyanosis, dentition good        Neck:  JVP normal;no carotid bruit        Respiratory:  clear to auscultation;normal respiratory excursion         Cardiac: regular rhythm;normal S1 and S2     no presence of murmur          pulses full and equal                                        GI:  abdomen soft;BS " [NTNM] : x [de-identified] : 4594 normoactive        Extremities and Muscular Skeletal:  no edema              Neurological:  affect appropriate        Psych:  Alert and Oriented x 3        CC  No referring provider defined for this encounter.                Thank you for allowing me to participate in the care of your patient.      Sincerely,     ASHLEY GORDON MD     [facility]    cc:   No referring provider defined for this encounter.         [MTNM] : x [de-identified] : 6499 [de-identified] : left scalp

## 2020-10-07 ENCOUNTER — APPOINTMENT (OUTPATIENT)
Dept: GASTROENTEROLOGY | Facility: CLINIC | Age: 75
End: 2020-10-07
Payer: MEDICARE

## 2020-10-07 VITALS
BODY MASS INDEX: 18.12 KG/M2 | HEIGHT: 61 IN | DIASTOLIC BLOOD PRESSURE: 80 MMHG | SYSTOLIC BLOOD PRESSURE: 120 MMHG | HEART RATE: 95 BPM | TEMPERATURE: 97.9 F | WEIGHT: 96 LBS | OXYGEN SATURATION: 96 %

## 2020-10-07 DIAGNOSIS — R19.8 OTHER SPECIFIED SYMPTOMS AND SIGNS INVOLVING THE DIGESTIVE SYSTEM AND ABDOMEN: ICD-10-CM

## 2020-10-07 PROCEDURE — 99203 OFFICE O/P NEW LOW 30 MIN: CPT

## 2020-10-07 RX ORDER — LUBIPROSTONE 8 UG/1
8 CAPSULE, GELATIN COATED ORAL
Refills: 0 | Status: DISCONTINUED | COMMUNITY
End: 2020-10-07

## 2020-10-07 NOTE — ASSESSMENT
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 I recommend anorectal manometry to evaluate for the dysynergistic defecation. I recommend UroGYN evaluation. I recommend GYN evaluation for complete evaluation of the pelvic pain pain and CT finding of air in the endometrial cavity. She states that she has a GYN that she sees on a normal basis.

## 2020-10-07 NOTE — REASON FOR VISIT
[Initial Evaluation] : an initial evaluation [FreeTextEntry1] : abdominal pain, constipation, gas/bloating, rectal pain/pressure (2) cough or sneeze

## 2020-10-07 NOTE — PHYSICAL EXAM
[General Appearance - Alert] : alert [General Appearance - In No Acute Distress] : in no acute distress [Sclera] : the sclera and conjunctiva were normal [Outer Ear] : the ears and nose were normal in appearance [Auscultation Breath Sounds / Voice Sounds] : lungs were clear to auscultation bilaterally [Heart Rate And Rhythm] : heart rate was normal and rhythm regular [Heart Sounds] : normal S1 and S2 [Heart Sounds Gallop] : no gallops [Murmurs] : no murmurs [Heart Sounds Pericardial Friction Rub] : no pericardial rub [Edema] : there was no peripheral edema [Bowel Sounds] : normal bowel sounds [Abdomen Soft] : soft [Abdomen Tenderness] : non-tender [] : no hepato-splenomegaly [Abdomen Mass (___ Cm)] : no abdominal mass palpated [No Rectal Mass] : no rectal mass [External Hemorrhoid] : external hemorrhoids [FreeTextEntry1] : no anal fissure or skin tag; weak sphincter tone with normal increase abdominal pressure when asked to bear down [Skin Color & Pigmentation] : normal skin color and pigmentation [No Focal Deficits] : no focal deficits

## 2020-10-07 NOTE — HISTORY OF PRESENT ILLNESS
[FreeTextEntry1] : This is a 74-year-old female with history of diffuse large B-cell lymphoma presenting for a second opinion regarding chronic lower abdominal pain, gas, bloating constipation with rectal pain and pressure. She has been seen previously for many years by Dr. Robert Candelaria who is also her primary care. She was told to have irritable bowel. The lower abdominal pain which is suprapubic sometimes sharp in nature, occurs on a daily basis, and has been ongoing for most of her life. She has gas and bloating. She reports constipation in that she has bowel movements daily but states that she has to sit in the toilet for approximately one hour before producing a soft stool and she has feeling of incomplete evacuation. The stool comes out each usually flat. She has tried Linzess 72 mcg in the past for which she states initially gave her diarrhea and stopped working after 2-3 months. For the past one month, she is on amitiza 24 mg daily without improvement in bowel movements. She admits to having dairy products and a daily basis. She reports undergoing multiple colonoscopies, every 3 years, for over 10 years, last one being a year ago reported with benign colon polyp. She states that all her colonoscopies have been normal except for the one a year ago showing a benign polyp. This is the first time where she was told to have a polyp. She denies family history of colon cancer or colon polyps. She relates that she undergoes colonoscopies every 3 years because of her chronic lower abdominal pain. She also feels rectal pain and pressure, sometimes very severe, she denies associated rectal bleeding. She states that while undergoing chemotherapy for the lymphoma she developed worsening abdominal pain and constipation. Last treatment was in April. She's had to PET/CT scans this year showing no activity in her GI tract. She relates also multiple need to have urination. She's had 2 vaginal births in the past.

## 2020-10-09 ENCOUNTER — OUTPATIENT (OUTPATIENT)
Dept: OUTPATIENT SERVICES | Facility: HOSPITAL | Age: 75
LOS: 1 days | Discharge: HOME | End: 2020-10-09

## 2020-10-09 DIAGNOSIS — C85.90 NON-HODGKIN LYMPHOMA, UNSPECIFIED, UNSPECIFIED SITE: ICD-10-CM

## 2020-10-13 ENCOUNTER — APPOINTMENT (OUTPATIENT)
Dept: HEMATOLOGY ONCOLOGY | Facility: CLINIC | Age: 75
End: 2020-10-13
Payer: MEDICARE

## 2020-10-13 ENCOUNTER — RESULT REVIEW (OUTPATIENT)
Age: 75
End: 2020-10-13

## 2020-10-13 VITALS
RESPIRATION RATE: 16 BRPM | HEIGHT: 60.98 IN | WEIGHT: 96.34 LBS | SYSTOLIC BLOOD PRESSURE: 123 MMHG | HEART RATE: 79 BPM | BODY MASS INDEX: 18.19 KG/M2 | TEMPERATURE: 98.2 F | DIASTOLIC BLOOD PRESSURE: 77 MMHG | OXYGEN SATURATION: 97 %

## 2020-10-13 LAB
ALBUMIN SERPL ELPH-MCNC: 4.5 G/DL
ALP BLD-CCNC: 59 U/L
ALT SERPL-CCNC: 9 U/L
ANION GAP SERPL CALC-SCNC: 11 MMOL/L
AST SERPL-CCNC: 14 U/L
BASOPHILS # BLD AUTO: 0.1 K/UL — SIGNIFICANT CHANGE UP (ref 0–0.2)
BASOPHILS NFR BLD AUTO: 1.1 % — SIGNIFICANT CHANGE UP (ref 0–2)
BILIRUB SERPL-MCNC: 0.2 MG/DL
BUN SERPL-MCNC: 9 MG/DL
CALCIUM SERPL-MCNC: 9.8 MG/DL
CHLORIDE SERPL-SCNC: 103 MMOL/L
CO2 SERPL-SCNC: 27 MMOL/L
CREAT SERPL-MCNC: 0.66 MG/DL
EOSINOPHIL # BLD AUTO: 0.15 K/UL — SIGNIFICANT CHANGE UP (ref 0–0.5)
EOSINOPHIL NFR BLD AUTO: 1.7 % — SIGNIFICANT CHANGE UP (ref 0–6)
GLUCOSE SERPL-MCNC: 107 MG/DL
HCT VFR BLD CALC: 39.4 % — SIGNIFICANT CHANGE UP (ref 34.5–45)
HGB BLD-MCNC: 12.8 G/DL — SIGNIFICANT CHANGE UP (ref 11.5–15.5)
IMM GRANULOCYTES NFR BLD AUTO: 0.2 % — SIGNIFICANT CHANGE UP (ref 0–1.5)
LDH SERPL-CCNC: 158 U/L
LYMPHOCYTES # BLD AUTO: 1.57 K/UL — SIGNIFICANT CHANGE UP (ref 1–3.3)
LYMPHOCYTES # BLD AUTO: 18 % — SIGNIFICANT CHANGE UP (ref 13–44)
MCHC RBC-ENTMCNC: 28.5 PG — SIGNIFICANT CHANGE UP (ref 27–34)
MCHC RBC-ENTMCNC: 32.5 G/DL — SIGNIFICANT CHANGE UP (ref 32–36)
MCV RBC AUTO: 87.8 FL — SIGNIFICANT CHANGE UP (ref 80–100)
MONOCYTES # BLD AUTO: 0.93 K/UL — HIGH (ref 0–0.9)
MONOCYTES NFR BLD AUTO: 10.7 % — SIGNIFICANT CHANGE UP (ref 2–14)
NEUTROPHILS # BLD AUTO: 5.94 K/UL — SIGNIFICANT CHANGE UP (ref 1.8–7.4)
NEUTROPHILS NFR BLD AUTO: 68.3 % — SIGNIFICANT CHANGE UP (ref 43–77)
NRBC # BLD: 0 /100 WBCS — SIGNIFICANT CHANGE UP (ref 0–0)
PLATELET # BLD AUTO: 320 K/UL — SIGNIFICANT CHANGE UP (ref 150–400)
POTASSIUM SERPL-SCNC: 4 MMOL/L
PROT SERPL-MCNC: 6.5 G/DL
RBC # BLD: 4.49 M/UL — SIGNIFICANT CHANGE UP (ref 3.8–5.2)
RBC # FLD: 15 % — HIGH (ref 10.3–14.5)
SODIUM SERPL-SCNC: 141 MMOL/L
WBC # BLD: 8.71 K/UL — SIGNIFICANT CHANGE UP (ref 3.8–10.5)
WBC # FLD AUTO: 8.71 K/UL — SIGNIFICANT CHANGE UP (ref 3.8–10.5)

## 2020-10-13 PROCEDURE — 99213 OFFICE O/P EST LOW 20 MIN: CPT

## 2020-10-13 NOTE — HISTORY OF PRESENT ILLNESS
[0 - No Distress] : Distress Level: 0 [Verbal consent obtained from patient] : the patient, [unfilled] [de-identified] : 75 yo woman with PMHx of HLP, IBS,  referred for evaluation of diffuse large B cell lymphoma. \par \par Pt complaint of  left  anterior forehead mass for several years since hitting forehead with door and states has continued to increase in size and has discomfort. denies drainage or infection. Patient developed  during the summer time increase in pressure headaches everyday and also noticed blurry vision. Denies N/V. \par \par CT scan of the head 11/6/19: c/w there is mild soft tissue prominence deep to the marker placed over the left inferior frontal scalp without a discrete mass or fluid collection.\par 10/11/2019- MRI of the brain: Left frontal calvarial / sphenoid bone lesion with extracalvarial and dural involvement suspected.  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 Ki-67 50-60%. Diffuse large B cell lymphoma can not be rule out. FISH for MYC, BCL2 and BCL6 are pending. \par Immunohistochemical stains CD20, PAX5, CD10, BCL6, BCL2 ( weak), CD23,  c-MYC (20%)\par CD 30 few cells.\par Negative CD5, cyclin D1, P53. \par CD3 stain highlights small T cells in the background.\par CD21 stain highlight few residual follicular dendritic cell meshwork. \par EBV encoded RNA negative. \par \par Bone marrow biopsy 11/21/19 negative. \par \par PET/CT 11/26/19 \par \par 1. FDG avid left frontal scalp soft tissue thickening, consistent with biopsy-proven lymphoma. \par Underlying marrow signal abnormality seen on MRI is not definitively appreciated on PET/CT. \par 2. Minimally FDG avid 0.5 cm probable lymph node left parotid tail. \par 3. No FDG avid disease below the diaphragm.\par \par \par 12/19/19- S/P first cycle of R-CHOP complicated by mucositis, thrush and generalized weakness.  [de-identified] : \par 12/19/19- S/P first cycle of R-CHOP complicated by grade 2-3  mucositis, thrush and generalized weakness. \par 1/9/2020- Cycle # 2 Mini-CHOP, tolerated well.\par S/P cycle # 3 of Mini-CHOP on 1/30/2020.   \par Third cycle was complicated by grade three mucositis, decrease appetite, not able to drink fluids, similar to S/E with R-CHOP first cycle. \par Patient completed radiation therapy 20 treatments on 4/23/2020. \par \par Patient complaints of abdominal discomfort for several days, was evaluated by Dr Stephane WASHINGTON and was started on Amitiza. Patient is feeling better. Patient is also on Librax BID\par  ( PRN) with moderate relief. \par No other changes in her medical, surgical or social history since 7/7/20.

## 2020-10-13 NOTE — REVIEW OF SYSTEMS
[Abdominal Pain] : abdominal pain [Constipation] : constipation [Anxiety] : anxiety [Negative] : Allergic/Immunologic [Fatigue] : no fatigue

## 2020-10-13 NOTE — ASSESSMENT
[FreeTextEntry1] : \par 75 yo woman with PMHx of HLP, IBS, diagnosed with  diffuse large B cell lymphoma localized to the scalp. \par \par CT scan of the head 11/6/19: c/w there is mild soft tissue prominence deep to the marker placed over the left inferior frontal scalp without a discrete mass or fluid collection.\par 10/11/2019- MRI of the brain: Left frontal calvarial / sphenoid bone lesion with extracalvarial and dural involvement suspected. 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 Ki-67 50-60%. Diffuse large B cell lymphoma can not be rule out. FISH for MYC, BCL2 and BCL6 are pending. \par Immunohistochemical stains CD20, PAX5, CD10, BCL6, BCL2 ( weak), CD23, c-MYC (20%)\par CD 30 few cells.\par Negative CD5, cyclin D1, P53. \par CD3 stain highlights small T cells in the background.\par CD21 stain highlight few residual follicular dendritic cell meshwork. \par EBV encoded RNA negative. \par \par Bone marrow biopsy 11/21/19 negative. \par \par PET/CT 11/26/19 \par \par 1. FDG avid left frontal scalp soft tissue thickening, consistent with biopsy-proven lymphoma. \par Underlying marrow signal abnormality seen on MRI is not definitively appreciated on PET/CT. \par 2. Minimally FDG avid 0.5 cm probable lymph node left parotid tail. \par 3. No FDG avid disease below the diaphragm.\par \par Patient had an echocardiogram done 12/11/19  EF 70%\par \par 12/19/19- S/P first cycle of R-CHOP complicated by grade 2-3  mucositis, thrush and generalized weakness. \par 1/9/2020- Cycle # 2 Mini-CHOP, tolerated well.\par S/P cycle # 3 of Mini-CHOP on 1/30/2020.   \par Third cycle was complicated by grade three mucositis, decrease appetite, not able to drink fluids, similar to S/E with R-CHOP first cycle. Chemotherapy was discontinued at this time. \par Patient completed radiation therapy 20 treatments on 4/23/2020 with Dr Spencer. \par \par Patient complaints of abdominal discomfort for several weeks, was evaluated by Dr Stephane WASHINGTON and was started on Amitiza. Patient is feeling better. Patient is also on Librax BID ( PRN) with moderate relief.  \par \par PET/CT 7/29/2020: No evidence of recurrent disease. Will repeat in 3 months. \par \par RTC 3 months. \par

## 2020-11-01 ENCOUNTER — APPOINTMENT (OUTPATIENT)
Dept: DISASTER EMERGENCY | Facility: CLINIC | Age: 75
End: 2020-11-01

## 2020-11-04 ENCOUNTER — APPOINTMENT (OUTPATIENT)
Dept: GASTROENTEROLOGY | Facility: HOSPITAL | Age: 75
End: 2020-11-04

## 2020-11-16 ENCOUNTER — TRANSCRIPTION ENCOUNTER (OUTPATIENT)
Age: 75
End: 2020-11-16

## 2021-01-28 ENCOUNTER — OUTPATIENT (OUTPATIENT)
Dept: OUTPATIENT SERVICES | Facility: HOSPITAL | Age: 76
LOS: 1 days | Discharge: HOME | End: 2021-01-28

## 2021-01-28 DIAGNOSIS — C85.90 NON-HODGKIN LYMPHOMA, UNSPECIFIED, UNSPECIFIED SITE: ICD-10-CM

## 2021-01-29 ENCOUNTER — RESULT REVIEW (OUTPATIENT)
Age: 76
End: 2021-01-29

## 2021-01-29 ENCOUNTER — APPOINTMENT (OUTPATIENT)
Dept: HEMATOLOGY ONCOLOGY | Facility: CLINIC | Age: 76
End: 2021-01-29
Payer: MEDICARE

## 2021-01-29 VITALS
HEART RATE: 71 BPM | HEIGHT: 60.98 IN | WEIGHT: 95.9 LBS | OXYGEN SATURATION: 97 % | TEMPERATURE: 97.8 F | BODY MASS INDEX: 18.11 KG/M2 | DIASTOLIC BLOOD PRESSURE: 72 MMHG | RESPIRATION RATE: 17 BRPM | SYSTOLIC BLOOD PRESSURE: 124 MMHG

## 2021-01-29 LAB
BASOPHILS # BLD AUTO: 0.08 K/UL — SIGNIFICANT CHANGE UP (ref 0–0.2)
BASOPHILS NFR BLD AUTO: 0.7 % — SIGNIFICANT CHANGE UP (ref 0–2)
EOSINOPHIL # BLD AUTO: 0.09 K/UL — SIGNIFICANT CHANGE UP (ref 0–0.5)
EOSINOPHIL NFR BLD AUTO: 0.8 % — SIGNIFICANT CHANGE UP (ref 0–6)
HCT VFR BLD CALC: 39.7 % — SIGNIFICANT CHANGE UP (ref 34.5–45)
HGB BLD-MCNC: 13.2 G/DL — SIGNIFICANT CHANGE UP (ref 11.5–15.5)
IMM GRANULOCYTES NFR BLD AUTO: 0.4 % — SIGNIFICANT CHANGE UP (ref 0–1.5)
LYMPHOCYTES # BLD AUTO: 1.76 K/UL — SIGNIFICANT CHANGE UP (ref 1–3.3)
LYMPHOCYTES # BLD AUTO: 16.3 % — SIGNIFICANT CHANGE UP (ref 13–44)
MCHC RBC-ENTMCNC: 28.6 PG — SIGNIFICANT CHANGE UP (ref 27–34)
MCHC RBC-ENTMCNC: 33.2 G/DL — SIGNIFICANT CHANGE UP (ref 32–36)
MCV RBC AUTO: 85.9 FL — SIGNIFICANT CHANGE UP (ref 80–100)
MONOCYTES # BLD AUTO: 0.91 K/UL — HIGH (ref 0–0.9)
MONOCYTES NFR BLD AUTO: 8.4 % — SIGNIFICANT CHANGE UP (ref 2–14)
NEUTROPHILS # BLD AUTO: 7.9 K/UL — HIGH (ref 1.8–7.4)
NEUTROPHILS NFR BLD AUTO: 73.4 % — SIGNIFICANT CHANGE UP (ref 43–77)
NRBC # BLD: 0 /100 WBCS — SIGNIFICANT CHANGE UP (ref 0–0)
PLATELET # BLD AUTO: 337 K/UL — SIGNIFICANT CHANGE UP (ref 150–400)
RBC # BLD: 4.62 M/UL — SIGNIFICANT CHANGE UP (ref 3.8–5.2)
RBC # FLD: 15.2 % — HIGH (ref 10.3–14.5)
WBC # BLD: 10.78 K/UL — HIGH (ref 3.8–10.5)
WBC # FLD AUTO: 10.78 K/UL — HIGH (ref 3.8–10.5)

## 2021-01-29 PROCEDURE — 99214 OFFICE O/P EST MOD 30 MIN: CPT

## 2021-01-29 NOTE — HISTORY OF PRESENT ILLNESS
[0 - No Distress] : Distress Level: 0 [de-identified] : 74 yo woman with PMHx of HLP, IBS,  referred for evaluation of diffuse large B cell lymphoma. \par \par Pt complaint of  left  anterior forehead mass for several years since hitting forehead with door and states has continued to increase in size and has discomfort. denies drainage or infection. Patient developed  during the summer time increase in pressure headaches everyday and also noticed blurry vision. Denies N/V. \par \par CT scan of the head 11/6/19: c/w there is mild soft tissue prominence deep to the marker placed over the left inferior frontal scalp without a discrete mass or fluid collection.\par 10/11/2019- MRI of the brain: Left frontal calvarial / sphenoid bone lesion with extracalvarial and dural involvement suspected.  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 Ki-67 50-60%. Diffuse large B cell lymphoma can not be rule out. FISH for MYC, BCL2 and BCL6 are pending. \par Immunohistochemical stains CD20, PAX5, CD10, BCL6, BCL2 ( weak), CD23,  c-MYC (20%)\par CD 30 few cells.\par Negative CD5, cyclin D1, P53. \par CD3 stain highlights small T cells in the background.\par CD21 stain highlight few residual follicular dendritic cell meshwork. \par EBV encoded RNA negative. \par \par Bone marrow biopsy 11/21/19 negative. \par \par PET/CT 11/26/19 \par \par 1. FDG avid left frontal scalp soft tissue thickening, consistent with biopsy-proven lymphoma. \par Underlying marrow signal abnormality seen on MRI is not definitively appreciated on PET/CT. \par 2. Minimally FDG avid 0.5 cm probable lymph node left parotid tail. \par 3. No FDG avid disease below the diaphragm.\par \par \par 12/19/19- S/P first cycle of R-CHOP complicated by mucositis, thrush and generalized weakness.  [de-identified] : \par 12/19/19- S/P first cycle of R-CHOP complicated by grade 2-3  mucositis, thrush and generalized weakness. \par 1/9/2020- Cycle # 2 Mini-CHOP, tolerated well.\par S/P cycle # 3 of Mini-CHOP on 1/30/2020.   \par Third cycle was complicated by grade three mucositis, decrease appetite, not able to drink fluids, similar to S/E with R-CHOP first cycle. \par Patient completed radiation therapy 20 treatments on 4/23/2020. \par \par Patient is feeling better,  evaluated by Dr Stephane WASHINGTON.\par \par No other changes in her medical, surgical or social history since 11/16/20.

## 2021-01-29 NOTE — ASSESSMENT
[FreeTextEntry1] : \par 74 yo woman with PMHx of HLP, IBS, diagnosed with  diffuse large B cell lymphoma localized to the scalp. \par \par CT scan of the head 11/6/19: c/w there is mild soft tissue prominence deep to the marker placed over the left inferior frontal scalp without a discrete mass or fluid collection.\par 10/11/2019- MRI of the brain: Left frontal calvarial / sphenoid bone lesion with extracalvarial and dural involvement suspected. 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 Ki-67 50-60%. Diffuse large B cell lymphoma can not be rule out. FISH for MYC, BCL2 and BCL6 are pending. \par Immunohistochemical stains CD20, PAX5, CD10, BCL6, BCL2 ( weak), CD23, c-MYC (20%)\par CD 30 few cells.\par Negative CD5, cyclin D1, P53. \par CD3 stain highlights small T cells in the background.\par CD21 stain highlight few residual follicular dendritic cell meshwork. \par EBV encoded RNA negative. \par \par Bone marrow biopsy 11/21/19 negative. \par \par PET/CT 11/26/19 \par \par 1. FDG avid left frontal scalp soft tissue thickening, consistent with biopsy-proven lymphoma. \par Underlying marrow signal abnormality seen on MRI is not definitively appreciated on PET/CT. \par 2. Minimally FDG avid 0.5 cm probable lymph node left parotid tail. \par 3. No FDG avid disease below the diaphragm.\par \par Patient had an echocardiogram done 12/11/19  EF 70%\par \par 12/19/19- S/P first cycle of R-CHOP complicated by grade 2-3  mucositis, thrush and generalized weakness. \par 1/9/2020- Cycle # 2 Mini-CHOP, tolerated well.\par S/P cycle # 3 of Mini-CHOP on 1/30/2020.   \par Third cycle was complicated by grade three mucositis, decrease appetite, not able to drink fluids, similar to S/E with R-CHOP first cycle. Chemotherapy was discontinued at this time. \par Patient completed radiation therapy 20 treatments on 4/23/2020 with Dr Spencer. \par \par PET/CT 7/29/2020: No evidence of recurrent disease. \par \par Patient will have a CT scan of the brain for surveillance to r/o recurrent disease. Counts are stable. \par \par RTC 3 months. \par

## 2021-02-01 LAB
ALBUMIN SERPL ELPH-MCNC: 4.4 G/DL
ALP BLD-CCNC: 71 U/L
ALT SERPL-CCNC: 10 U/L
ANION GAP SERPL CALC-SCNC: 12 MMOL/L
AST SERPL-CCNC: 15 U/L
BILIRUB SERPL-MCNC: 0.4 MG/DL
BUN SERPL-MCNC: 13 MG/DL
CALCIUM SERPL-MCNC: 10 MG/DL
CHLORIDE SERPL-SCNC: 103 MMOL/L
CO2 SERPL-SCNC: 23 MMOL/L
CREAT SERPL-MCNC: 0.8 MG/DL
GLUCOSE SERPL-MCNC: 97 MG/DL
LDH SERPL-CCNC: 166 U/L
POTASSIUM SERPL-SCNC: 4.1 MMOL/L
PROT SERPL-MCNC: 6.6 G/DL
SODIUM SERPL-SCNC: 138 MMOL/L

## 2021-02-12 ENCOUNTER — APPOINTMENT (OUTPATIENT)
Dept: CT IMAGING | Facility: CLINIC | Age: 76
End: 2021-02-12
Payer: MEDICARE

## 2021-02-12 ENCOUNTER — OUTPATIENT (OUTPATIENT)
Dept: OUTPATIENT SERVICES | Facility: HOSPITAL | Age: 76
LOS: 1 days | End: 2021-02-12

## 2021-02-12 DIAGNOSIS — C83.39 DIFFUSE LARGE B-CELL LYMPHOMA, EXTRANODAL AND SOLID ORGAN SITES: ICD-10-CM

## 2021-02-12 PROCEDURE — 70460 CT HEAD/BRAIN W/DYE: CPT | Mod: 26

## 2021-05-08 ENCOUNTER — OUTPATIENT (OUTPATIENT)
Dept: OUTPATIENT SERVICES | Facility: HOSPITAL | Age: 76
LOS: 1 days | Discharge: HOME | End: 2021-05-08

## 2021-05-08 DIAGNOSIS — C85.90 NON-HODGKIN LYMPHOMA, UNSPECIFIED, UNSPECIFIED SITE: ICD-10-CM

## 2021-05-11 ENCOUNTER — RESULT REVIEW (OUTPATIENT)
Age: 76
End: 2021-05-11

## 2021-05-11 ENCOUNTER — APPOINTMENT (OUTPATIENT)
Dept: HEMATOLOGY ONCOLOGY | Facility: CLINIC | Age: 76
End: 2021-05-11
Payer: MEDICARE

## 2021-05-11 VITALS
BODY MASS INDEX: 17.87 KG/M2 | HEIGHT: 61.57 IN | HEART RATE: 74 BPM | TEMPERATURE: 97.9 F | OXYGEN SATURATION: 99 % | SYSTOLIC BLOOD PRESSURE: 132 MMHG | DIASTOLIC BLOOD PRESSURE: 81 MMHG | RESPIRATION RATE: 17 BRPM | WEIGHT: 95.9 LBS

## 2021-05-11 LAB
ALBUMIN SERPL ELPH-MCNC: 4.4 G/DL
ALP BLD-CCNC: 69 U/L
ALT SERPL-CCNC: 10 U/L
ANION GAP SERPL CALC-SCNC: 13 MMOL/L
AST SERPL-CCNC: 16 U/L
BASOPHILS # BLD AUTO: 0.07 K/UL — SIGNIFICANT CHANGE UP (ref 0–0.2)
BASOPHILS NFR BLD AUTO: 0.8 % — SIGNIFICANT CHANGE UP (ref 0–2)
BILIRUB SERPL-MCNC: 0.4 MG/DL
BUN SERPL-MCNC: 10 MG/DL
CALCIUM SERPL-MCNC: 9.7 MG/DL
CHLORIDE SERPL-SCNC: 104 MMOL/L
CO2 SERPL-SCNC: 23 MMOL/L
CREAT SERPL-MCNC: 0.75 MG/DL
EOSINOPHIL # BLD AUTO: 0.12 K/UL — SIGNIFICANT CHANGE UP (ref 0–0.5)
EOSINOPHIL NFR BLD AUTO: 1.3 % — SIGNIFICANT CHANGE UP (ref 0–6)
GLUCOSE SERPL-MCNC: 95 MG/DL
HCT VFR BLD CALC: 40 % — SIGNIFICANT CHANGE UP (ref 34.5–45)
HGB BLD-MCNC: 13.4 G/DL — SIGNIFICANT CHANGE UP (ref 11.5–15.5)
IMM GRANULOCYTES NFR BLD AUTO: 0.4 % — SIGNIFICANT CHANGE UP (ref 0–1.5)
LDH SERPL-CCNC: 181 U/L
LYMPHOCYTES # BLD AUTO: 1.64 K/UL — SIGNIFICANT CHANGE UP (ref 1–3.3)
LYMPHOCYTES # BLD AUTO: 18.1 % — SIGNIFICANT CHANGE UP (ref 13–44)
MCHC RBC-ENTMCNC: 28.9 PG — SIGNIFICANT CHANGE UP (ref 27–34)
MCHC RBC-ENTMCNC: 33.5 G/DL — SIGNIFICANT CHANGE UP (ref 32–36)
MCV RBC AUTO: 86.2 FL — SIGNIFICANT CHANGE UP (ref 80–100)
MONOCYTES # BLD AUTO: 0.81 K/UL — SIGNIFICANT CHANGE UP (ref 0–0.9)
MONOCYTES NFR BLD AUTO: 9 % — SIGNIFICANT CHANGE UP (ref 2–14)
NEUTROPHILS # BLD AUTO: 6.36 K/UL — SIGNIFICANT CHANGE UP (ref 1.8–7.4)
NEUTROPHILS NFR BLD AUTO: 70.4 % — SIGNIFICANT CHANGE UP (ref 43–77)
NRBC # BLD: 0 /100 WBCS — SIGNIFICANT CHANGE UP (ref 0–0)
PLATELET # BLD AUTO: 325 K/UL — SIGNIFICANT CHANGE UP (ref 150–400)
POTASSIUM SERPL-SCNC: 3.7 MMOL/L
PROT SERPL-MCNC: 6.7 G/DL
RBC # BLD: 4.64 M/UL — SIGNIFICANT CHANGE UP (ref 3.8–5.2)
RBC # FLD: 14.7 % — HIGH (ref 10.3–14.5)
SODIUM SERPL-SCNC: 139 MMOL/L
WBC # BLD: 9.04 K/UL — SIGNIFICANT CHANGE UP (ref 3.8–10.5)
WBC # FLD AUTO: 9.04 K/UL — SIGNIFICANT CHANGE UP (ref 3.8–10.5)

## 2021-05-11 PROCEDURE — 99214 OFFICE O/P EST MOD 30 MIN: CPT

## 2021-05-11 NOTE — ASSESSMENT
[FreeTextEntry1] : \par 74 yo woman with PMHx of HLP, IBS, diagnosed with  diffuse large B cell lymphoma localized to the scalp. \par \par CT scan of the head 11/6/19: c/w there is mild soft tissue prominence deep to the marker placed over the left inferior frontal scalp without a discrete mass or fluid collection.\par 10/11/2019- MRI of the brain: Left frontal calvarial / sphenoid bone lesion with extracalvarial and dural involvement suspected. 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 Ki-67 50-60%. Diffuse large B cell lymphoma can not be rule out. FISH for MYC, BCL2 and BCL6 are pending. \par Immunohistochemical stains CD20, PAX5, CD10, BCL6, BCL2 ( weak), CD23, c-MYC (20%)\par CD 30 few cells.\par Negative CD5, cyclin D1, P53. \par CD3 stain highlights small T cells in the background.\par CD21 stain highlight few residual follicular dendritic cell meshwork. \par EBV encoded RNA negative. \par \par Bone marrow biopsy 11/21/19 negative. \par \par PET/CT 11/26/19 \par \par 1. FDG avid left frontal scalp soft tissue thickening, consistent with biopsy-proven lymphoma. \par Underlying marrow signal abnormality seen on MRI is not definitively appreciated on PET/CT. \par 2. Minimally FDG avid 0.5 cm probable lymph node left parotid tail. \par 3. No FDG avid disease below the diaphragm.\par \par Patient had an echocardiogram done 12/11/19  EF 70%\par \par 12/19/19- S/P first cycle of R-CHOP complicated by grade 2-3  mucositis, thrush and generalized weakness. \par 1/9/2020- Cycle # 2 Mini-CHOP, tolerated well.\par S/P cycle # 3 of Mini-CHOP on 1/30/2020.   \par Third cycle was complicated by grade three mucositis, decrease appetite, not able to drink fluids, similar to S/E with R-CHOP first cycle. Chemotherapy was discontinued at this time. \par Patient completed radiation therapy 20 treatments on 4/23/2020 with Dr Spencer. \par \par PET/CT 7/29/2020: No evidence of recurrent disease. \par \par CT scan of the brain 2/17/21: No hemorrhage, No abnormal enhancement. Sclerotic appearance of the left frontal calvarium in the region of the previous abnormal signal on 10/11/2019 which may be related to treated lymphoma. No extra axial or extracalvarial abnormal soft tissue. \par \par GI- Patient is complaining of abdominal pain every day,  evaluated by Dr Stephane WASHINGTON. Patient is scheduled for MRI  enterography on 5/12/21. \par \par RTC 4 months. \par

## 2021-05-11 NOTE — HISTORY OF PRESENT ILLNESS
[de-identified] : 74 yo woman with PMHx of HLP, IBS,  referred for evaluation of diffuse large B cell lymphoma. \par \par Pt complaint of  left  anterior forehead mass for several years since hitting forehead with door and states has continued to increase in size and has discomfort. denies drainage or infection. Patient developed  during the summer time increase in pressure headaches everyday and also noticed blurry vision. Denies N/V. \par \par CT scan of the head 11/6/19: c/w there is mild soft tissue prominence deep to the marker placed over the left inferior frontal scalp without a discrete mass or fluid collection.\par 10/11/2019- MRI of the brain: Left frontal calvarial / sphenoid bone lesion with extracalvarial and dural involvement suspected.  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 Ki-67 50-60%. Diffuse large B cell lymphoma can not be rule out. FISH for MYC, BCL2 and BCL6 are pending. \par Immunohistochemical stains CD20, PAX5, CD10, BCL6, BCL2 ( weak), CD23,  c-MYC (20%)\par CD 30 few cells.\par Negative CD5, cyclin D1, P53. \par CD3 stain highlights small T cells in the background.\par CD21 stain highlight few residual follicular dendritic cell meshwork. \par EBV encoded RNA negative. \par \par Bone marrow biopsy 11/21/19 negative. \par \par PET/CT 11/26/19 \par \par 1. FDG avid left frontal scalp soft tissue thickening, consistent with biopsy-proven lymphoma. \par Underlying marrow signal abnormality seen on MRI is not definitively appreciated on PET/CT. \par 2. Minimally FDG avid 0.5 cm probable lymph node left parotid tail. \par 3. No FDG avid disease below the diaphragm.\par \par \par 12/19/19- S/P first cycle of R-CHOP complicated by mucositis, thrush and generalized weakness.  [de-identified] : \par 12/19/19- S/P first cycle of R-CHOP complicated by grade 2-3  mucositis, thrush and generalized weakness. \par 1/9/2020- Cycle # 2 Mini-CHOP, tolerated well.\par S/P cycle # 3 of Mini-CHOP on 1/30/2020.   \par Third cycle was complicated by grade three mucositis, decrease appetite, not able to drink fluids, similar to S/E with R-CHOP first cycle. \par Patient completed radiation therapy 20 treatments on 4/23/2020. \par \par Patient is complaining abdominal pain every day,  evaluated by Dr Stephane WASHINGTON. Patient is scheduled for MRI enterography on 5/12/21. \par \par CT scan of the brain was negative on 2/12/21. \par \par No other changes in her medical, surgical or social history since 1/29/21.

## 2021-07-15 ENCOUNTER — TRANSCRIPTION ENCOUNTER (OUTPATIENT)
Age: 76
End: 2021-07-15

## 2021-07-20 ENCOUNTER — TRANSCRIPTION ENCOUNTER (OUTPATIENT)
Age: 76
End: 2021-07-20

## 2021-08-03 ENCOUNTER — APPOINTMENT (OUTPATIENT)
Dept: NUCLEAR MEDICINE | Facility: CLINIC | Age: 76
End: 2021-08-03
Payer: MEDICARE

## 2021-08-03 ENCOUNTER — OUTPATIENT (OUTPATIENT)
Dept: OUTPATIENT SERVICES | Facility: HOSPITAL | Age: 76
LOS: 1 days | End: 2021-08-03

## 2021-08-03 DIAGNOSIS — C83.39 DIFFUSE LARGE B-CELL LYMPHOMA, EXTRANODAL AND SOLID ORGAN SITES: ICD-10-CM

## 2021-08-03 PROCEDURE — 78815 PET IMAGE W/CT SKULL-THIGH: CPT | Mod: 26,PS

## 2021-08-05 ENCOUNTER — NON-APPOINTMENT (OUTPATIENT)
Age: 76
End: 2021-08-05

## 2021-08-16 ENCOUNTER — TRANSCRIPTION ENCOUNTER (OUTPATIENT)
Age: 76
End: 2021-08-16

## 2021-09-08 ENCOUNTER — OUTPATIENT (OUTPATIENT)
Dept: OUTPATIENT SERVICES | Facility: HOSPITAL | Age: 76
LOS: 1 days | Discharge: HOME | End: 2021-09-08

## 2021-09-08 DIAGNOSIS — C85.90 NON-HODGKIN LYMPHOMA, UNSPECIFIED, UNSPECIFIED SITE: ICD-10-CM

## 2021-09-10 ENCOUNTER — RESULT REVIEW (OUTPATIENT)
Age: 76
End: 2021-09-10

## 2021-09-10 ENCOUNTER — APPOINTMENT (OUTPATIENT)
Dept: HEMATOLOGY ONCOLOGY | Facility: CLINIC | Age: 76
End: 2021-09-10
Payer: MEDICARE

## 2021-09-10 VITALS
SYSTOLIC BLOOD PRESSURE: 131 MMHG | WEIGHT: 92.57 LBS | TEMPERATURE: 97.7 F | RESPIRATION RATE: 17 BRPM | DIASTOLIC BLOOD PRESSURE: 84 MMHG | OXYGEN SATURATION: 97 % | HEART RATE: 72 BPM | BODY MASS INDEX: 17.5 KG/M2

## 2021-09-10 LAB
BASOPHILS # BLD AUTO: 0.08 K/UL — SIGNIFICANT CHANGE UP (ref 0–0.2)
BASOPHILS NFR BLD AUTO: 0.8 % — SIGNIFICANT CHANGE UP (ref 0–2)
EOSINOPHIL # BLD AUTO: 0.17 K/UL — SIGNIFICANT CHANGE UP (ref 0–0.5)
EOSINOPHIL NFR BLD AUTO: 1.7 % — SIGNIFICANT CHANGE UP (ref 0–6)
HCT VFR BLD CALC: 36.1 % — SIGNIFICANT CHANGE UP (ref 34.5–45)
HGB BLD-MCNC: 11.9 G/DL — SIGNIFICANT CHANGE UP (ref 11.5–15.5)
IMM GRANULOCYTES NFR BLD AUTO: 0.4 % — SIGNIFICANT CHANGE UP (ref 0–1.5)
LYMPHOCYTES # BLD AUTO: 2.29 K/UL — SIGNIFICANT CHANGE UP (ref 1–3.3)
LYMPHOCYTES # BLD AUTO: 22.3 % — SIGNIFICANT CHANGE UP (ref 13–44)
MCHC RBC-ENTMCNC: 28.7 PG — SIGNIFICANT CHANGE UP (ref 27–34)
MCHC RBC-ENTMCNC: 33 G/DL — SIGNIFICANT CHANGE UP (ref 32–36)
MCV RBC AUTO: 87.2 FL — SIGNIFICANT CHANGE UP (ref 80–100)
MONOCYTES # BLD AUTO: 1.06 K/UL — HIGH (ref 0–0.9)
MONOCYTES NFR BLD AUTO: 10.3 % — SIGNIFICANT CHANGE UP (ref 2–14)
NEUTROPHILS # BLD AUTO: 6.62 K/UL — SIGNIFICANT CHANGE UP (ref 1.8–7.4)
NEUTROPHILS NFR BLD AUTO: 64.5 % — SIGNIFICANT CHANGE UP (ref 43–77)
NRBC # BLD: 0 /100 WBCS — SIGNIFICANT CHANGE UP (ref 0–0)
PLATELET # BLD AUTO: 265 K/UL — SIGNIFICANT CHANGE UP (ref 150–400)
RBC # BLD: 4.14 M/UL — SIGNIFICANT CHANGE UP (ref 3.8–5.2)
RBC # FLD: 15.9 % — HIGH (ref 10.3–14.5)
WBC # BLD: 10.26 K/UL — SIGNIFICANT CHANGE UP (ref 3.8–10.5)
WBC # FLD AUTO: 10.26 K/UL — SIGNIFICANT CHANGE UP (ref 3.8–10.5)

## 2021-09-10 PROCEDURE — 99214 OFFICE O/P EST MOD 30 MIN: CPT

## 2021-09-10 RX ORDER — DRONABINOL 2.5 MG/1
2.5 CAPSULE ORAL
Qty: 60 | Refills: 3 | Status: DISCONTINUED | COMMUNITY
Start: 2021-07-22 | End: 2021-09-10

## 2021-09-10 NOTE — ASSESSMENT
[FreeTextEntry1] : \par 74 yo woman with PMHx of HLP, IBS, diagnosed with  diffuse large B cell lymphoma localized to the scalp. \par \par CT scan of the head 11/6/19: c/w there is mild soft tissue prominence deep to the marker placed over the left inferior frontal scalp without a discrete mass or fluid collection.\par 10/11/2019- MRI of the brain: Left frontal calvarial / sphenoid bone lesion with extracalvarial and dural involvement suspected. 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 Ki-67 50-60%. Diffuse large B cell lymphoma can not be rule out. FISH for MYC, BCL2 and BCL6 are pending. \par Immunohistochemical stains CD20, PAX5, CD10, BCL6, BCL2 ( weak), CD23, c-MYC (20%)\par CD 30 few cells.\par Negative CD5, cyclin D1, P53. \par CD3 stain highlights small T cells in the background.\par CD21 stain highlight few residual follicular dendritic cell meshwork. \par EBV encoded RNA negative. \par \par Bone marrow biopsy 11/21/19 negative. \par \par PET/CT 11/26/19 \par \par 1. FDG avid left frontal scalp soft tissue thickening, consistent with biopsy-proven lymphoma. \par Underlying marrow signal abnormality seen on MRI is not definitively appreciated on PET/CT. \par 2. Minimally FDG avid 0.5 cm probable lymph node left parotid tail. \par 3. No FDG avid disease below the diaphragm.\par \par Patient had an echocardiogram done 12/11/19  EF 70%\par \par 12/19/19- S/P first cycle of R-CHOP complicated by grade 2-3  mucositis, thrush and generalized weakness. \par 1/9/2020- Cycle # 2 Mini-CHOP, tolerated well.\par S/P cycle # 3 of Mini-CHOP on 1/30/2020.   \par Third cycle was complicated by grade three mucositis, decrease appetite, not able to drink fluids, similar to S/E with R-CHOP first cycle. Chemotherapy was discontinued at this time. \par Patient completed radiation therapy 20 treatments on 4/23/2020 with Dr Spencer. \par \par PET/CT 7/29/2020: No evidence of recurrent disease. \par \par CT scan of the brain 2/17/21: No hemorrhage, No abnormal enhancement. Sclerotic appearance of the left frontal calvarium in the region of the previous abnormal signal on 10/11/2019 which may be related to treated lymphoma. No extra axial or extracalvarial abnormal soft tissue. \par \par GI- Patient complaints of having chronic abdominal pain 3-4/10, decrease appetite, has internal and external hemorrhoids (painful).  \par \par Patient is scheduled for a colonoscopy next week.  Will also see a colorectal surgeon next month. \par \par \par RTC 4 months. \par

## 2021-09-10 NOTE — HISTORY OF PRESENT ILLNESS
[0 - No Distress] : Distress Level: 0 [de-identified] : 76 yo woman with PMHx of HLP, IBS,  referred for evaluation of diffuse large B cell lymphoma. \par \par Pt complaint of  left  anterior forehead mass for several years since hitting forehead with door and states has continued to increase in size and has discomfort. denies drainage or infection. Patient developed  during the summer time increase in pressure headaches everyday and also noticed blurry vision. Denies N/V. \par \par CT scan of the head 11/6/19: c/w there is mild soft tissue prominence deep to the marker placed over the left inferior frontal scalp without a discrete mass or fluid collection.\par 10/11/2019- MRI of the brain: Left frontal calvarial / sphenoid bone lesion with extracalvarial and dural involvement suspected.  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 Ki-67 50-60%. Diffuse large B cell lymphoma can not be rule out. FISH for MYC, BCL2 and BCL6 are pending. \par Immunohistochemical stains CD20, PAX5, CD10, BCL6, BCL2 ( weak), CD23,  c-MYC (20%)\par CD 30 few cells.\par Negative CD5, cyclin D1, P53. \par CD3 stain highlights small T cells in the background.\par CD21 stain highlight few residual follicular dendritic cell meshwork. \par EBV encoded RNA negative. \par \par Bone marrow biopsy 11/21/19 negative. \par \par PET/CT 11/26/19 \par \par 1. FDG avid left frontal scalp soft tissue thickening, consistent with biopsy-proven lymphoma. \par Underlying marrow signal abnormality seen on MRI is not definitively appreciated on PET/CT. \par 2. Minimally FDG avid 0.5 cm probable lymph node left parotid tail. \par 3. No FDG avid disease below the diaphragm.\par \par \par 12/19/19- S/P first cycle of R-CHOP complicated by mucositis, thrush and generalized weakness.  [de-identified] : \par 12/19/19- S/P first cycle of R-CHOP complicated by grade 2-3  mucositis, thrush and generalized weakness. \par 1/9/2020- Cycle # 2 Mini-CHOP, tolerated well.\par S/P cycle # 3 of Mini-CHOP on 1/30/2020.   \par Third cycle was complicated by grade three mucositis, decrease appetite, not able to drink fluids, similar to S/E with R-CHOP first cycle. \par Patient completed radiation therapy 20 treatments on 4/23/2020. \par Patient complaints of having chronic abdominal pain 3-4/10, decrease appetite, has internal and external hemorrhoids (painful).  \par \par Patient is scheduled for a colonoscopy next week.  Will also see a colorectal surgeon next month. \par \par CT scan of the brain was negative on 2/12/21. \par \par No other changes in her medical, surgical or social history since 5/11/21.

## 2021-09-10 NOTE — PHYSICAL EXAM
[Ambulatory and capable of all self care but unable to carry out any work activities] : Status 2- Ambulatory and capable of all self care but unable to carry out any work activities. Up and about more than 50% of waking hours [Thin] : thin [Normal] : affect appropriate [de-identified] : distended, BS increased in 4 quadrants

## 2021-09-13 ENCOUNTER — TRANSCRIPTION ENCOUNTER (OUTPATIENT)
Age: 76
End: 2021-09-13

## 2021-09-13 LAB
ALBUMIN SERPL ELPH-MCNC: 4.6 G/DL
ALP BLD-CCNC: 55 U/L
ALT SERPL-CCNC: 13 U/L
ANION GAP SERPL CALC-SCNC: 13 MMOL/L
AST SERPL-CCNC: 17 U/L
BILIRUB SERPL-MCNC: 0.2 MG/DL
BUN SERPL-MCNC: 10 MG/DL
CALCIUM SERPL-MCNC: 9.9 MG/DL
CHLORIDE SERPL-SCNC: 102 MMOL/L
CO2 SERPL-SCNC: 24 MMOL/L
CREAT SERPL-MCNC: 0.82 MG/DL
GLUCOSE SERPL-MCNC: 78 MG/DL
LDH SERPL-CCNC: 176 U/L
POTASSIUM SERPL-SCNC: 4.6 MMOL/L
PROT SERPL-MCNC: 6.3 G/DL
SODIUM SERPL-SCNC: 139 MMOL/L

## 2021-10-22 ENCOUNTER — TRANSCRIPTION ENCOUNTER (OUTPATIENT)
Age: 76
End: 2021-10-22

## 2021-11-19 ENCOUNTER — NON-APPOINTMENT (OUTPATIENT)
Age: 76
End: 2021-11-19

## 2021-12-10 ENCOUNTER — OUTPATIENT (OUTPATIENT)
Dept: OUTPATIENT SERVICES | Facility: HOSPITAL | Age: 76
LOS: 1 days | Discharge: HOME | End: 2021-12-10

## 2021-12-10 DIAGNOSIS — C85.90 NON-HODGKIN LYMPHOMA, UNSPECIFIED, UNSPECIFIED SITE: ICD-10-CM

## 2021-12-14 ENCOUNTER — RESULT REVIEW (OUTPATIENT)
Age: 76
End: 2021-12-14

## 2021-12-14 ENCOUNTER — APPOINTMENT (OUTPATIENT)
Dept: HEMATOLOGY ONCOLOGY | Facility: CLINIC | Age: 76
End: 2021-12-14
Payer: MEDICARE

## 2021-12-14 VITALS
SYSTOLIC BLOOD PRESSURE: 135 MMHG | OXYGEN SATURATION: 95 % | BODY MASS INDEX: 18.02 KG/M2 | WEIGHT: 95.46 LBS | HEIGHT: 60.98 IN | TEMPERATURE: 87.9 F | RESPIRATION RATE: 17 BRPM | DIASTOLIC BLOOD PRESSURE: 80 MMHG | HEART RATE: 99 BPM

## 2021-12-14 LAB
BASOPHILS # BLD AUTO: 0.1 K/UL — SIGNIFICANT CHANGE UP (ref 0–0.2)
BASOPHILS NFR BLD AUTO: 1 % — SIGNIFICANT CHANGE UP (ref 0–2)
EOSINOPHIL # BLD AUTO: 0.22 K/UL — SIGNIFICANT CHANGE UP (ref 0–0.5)
EOSINOPHIL NFR BLD AUTO: 2.3 % — SIGNIFICANT CHANGE UP (ref 0–6)
HCT VFR BLD CALC: 39.9 % — SIGNIFICANT CHANGE UP (ref 34.5–45)
HGB BLD-MCNC: 13 G/DL — SIGNIFICANT CHANGE UP (ref 11.5–15.5)
IMM GRANULOCYTES NFR BLD AUTO: 0.5 % — SIGNIFICANT CHANGE UP (ref 0–1.5)
LYMPHOCYTES # BLD AUTO: 2.11 K/UL — SIGNIFICANT CHANGE UP (ref 1–3.3)
LYMPHOCYTES # BLD AUTO: 21.8 % — SIGNIFICANT CHANGE UP (ref 13–44)
MCHC RBC-ENTMCNC: 29 PG — SIGNIFICANT CHANGE UP (ref 27–34)
MCHC RBC-ENTMCNC: 32.6 G/DL — SIGNIFICANT CHANGE UP (ref 32–36)
MCV RBC AUTO: 89.1 FL — SIGNIFICANT CHANGE UP (ref 80–100)
MONOCYTES # BLD AUTO: 0.92 K/UL — HIGH (ref 0–0.9)
MONOCYTES NFR BLD AUTO: 9.5 % — SIGNIFICANT CHANGE UP (ref 2–14)
NEUTROPHILS # BLD AUTO: 6.29 K/UL — SIGNIFICANT CHANGE UP (ref 1.8–7.4)
NEUTROPHILS NFR BLD AUTO: 64.9 % — SIGNIFICANT CHANGE UP (ref 43–77)
NRBC # BLD: 0 /100 WBCS — SIGNIFICANT CHANGE UP (ref 0–0)
PLATELET # BLD AUTO: 330 K/UL — SIGNIFICANT CHANGE UP (ref 150–400)
RBC # BLD: 4.48 M/UL — SIGNIFICANT CHANGE UP (ref 3.8–5.2)
RBC # FLD: 13.6 % — SIGNIFICANT CHANGE UP (ref 10.3–14.5)
WBC # BLD: 9.69 K/UL — SIGNIFICANT CHANGE UP (ref 3.8–10.5)
WBC # FLD AUTO: 9.69 K/UL — SIGNIFICANT CHANGE UP (ref 3.8–10.5)

## 2021-12-14 PROCEDURE — 99214 OFFICE O/P EST MOD 30 MIN: CPT

## 2021-12-14 NOTE — HISTORY OF PRESENT ILLNESS
[de-identified] : 76 yo woman with PMHx of HLP, IBS,  referred for evaluation of diffuse large B cell lymphoma. \par \par Pt complaint of  left  anterior forehead mass for several years since hitting forehead with door and states has continued to increase in size and has discomfort. denies drainage or infection. Patient developed  during the summer time increase in pressure headaches everyday and also noticed blurry vision. Denies N/V. \par \par CT scan of the head 11/6/19: c/w there is mild soft tissue prominence deep to the marker placed over the left inferior frontal scalp without a discrete mass or fluid collection.\par 10/11/2019- MRI of the brain: Left frontal calvarial / sphenoid bone lesion with extracalvarial and dural involvement suspected.  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 Ki-67 50-60%. Diffuse large B cell lymphoma can not be rule out. FISH for MYC, BCL2 and BCL6 are pending. \par Immunohistochemical stains CD20, PAX5, CD10, BCL6, BCL2 ( weak), CD23,  c-MYC (20%)\par CD 30 few cells.\par Negative CD5, cyclin D1, P53. \par CD3 stain highlights small T cells in the background.\par CD21 stain highlight few residual follicular dendritic cell meshwork. \par EBV encoded RNA negative. \par \par Bone marrow biopsy 11/21/19 negative. \par \par PET/CT 11/26/19 \par \par 1. FDG avid left frontal scalp soft tissue thickening, consistent with biopsy-proven lymphoma. \par Underlying marrow signal abnormality seen on MRI is not definitively appreciated on PET/CT. \par 2. Minimally FDG avid 0.5 cm probable lymph node left parotid tail. \par 3. No FDG avid disease below the diaphragm.\par \par \par 12/19/19- S/P first cycle of R-CHOP complicated by mucositis, thrush and generalized weakness.  [de-identified] : \par 12/19/19- S/P first cycle of R-CHOP complicated by grade 2-3  mucositis, thrush and generalized weakness. \par 1/9/2020- Cycle # 2 Mini-CHOP, tolerated well.\par S/P cycle # 3 of Mini-CHOP on 1/30/2020.   \par Third cycle was complicated by grade three mucositis, decrease appetite, not able to drink fluids, similar to S/E with R-CHOP first cycle. \par Patient completed radiation therapy 20 treatments on 4/23/2020. \par Patient complaints of having chronic abdominal pain, decrease appetite, has internal and external hemorrhoids (painful).  \par \par CT scan of the brain was negative on 2/12/21. \par \par No other changes in her medical, surgical or social history since 9/10/21.

## 2021-12-14 NOTE — ASSESSMENT
[FreeTextEntry1] : \par 76 yo woman with PMHx of HLP, IBS, diagnosed with  diffuse large B cell lymphoma localized to the scalp. \par \par CT scan of the head 11/6/19: c/w there is mild soft tissue prominence deep to the marker placed over the left inferior frontal scalp without a discrete mass or fluid collection.\par 10/11/2019- MRI of the brain: Left frontal calvarial / sphenoid bone lesion with extracalvarial and dural involvement suspected. 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 Ki-67 50-60%. Diffuse large B cell lymphoma can not be rule out. FISH for MYC, BCL2 and BCL6 are pending. \par Immunohistochemical stains CD20, PAX5, CD10, BCL6, BCL2 ( weak), CD23, c-MYC (20%)\par CD 30 few cells.\par Negative CD5, cyclin D1, P53. \par CD3 stain highlights small T cells in the background.\par CD21 stain highlight few residual follicular dendritic cell meshwork. \par EBV encoded RNA negative. \par \par Bone marrow biopsy 11/21/19 negative. \par \par PET/CT 11/26/19 \par \par 1. FDG avid left frontal scalp soft tissue thickening, consistent with biopsy-proven lymphoma. \par Underlying marrow signal abnormality seen on MRI is not definitively appreciated on PET/CT. \par 2. Minimally FDG avid 0.5 cm probable lymph node left parotid tail. \par 3. No FDG avid disease below the diaphragm.\par \par Patient had an echocardiogram done 12/11/19  EF 70%\par \par 12/19/19- S/P first cycle of R-CHOP complicated by grade 2-3  mucositis, thrush and generalized weakness. \par 1/9/2020- Cycle # 2 Mini-CHOP, tolerated well.\par S/P cycle # 3 of Mini-CHOP on 1/30/2020.   \par Third cycle was complicated by grade three mucositis, decrease appetite, not able to drink fluids, similar to S/E with R-CHOP first cycle. Chemotherapy was discontinued at this time. \par Patient completed radiation therapy 20 treatments on 4/23/2020 with Dr Spencer. \par \par PET/CT 7/29/2020: No evidence of recurrent disease. \par \par CT scan of the brain 2/17/21: No hemorrhage, No abnormal enhancement. Sclerotic appearance of the left frontal calvarium in the region of the previous abnormal signal on 10/11/2019 which may be related to treated lymphoma. No extra axial or extracalvarial abnormal soft tissue. \par \par GI- Patient complaints of having chronic abdominal pain, decrease appetite, has internal and external hemorrhoids (painful).  Following with colorectal surgeon next month. On Percocet twice daily. \par \par 8/5/21-PET/CT: No evidence of FDG avid disease/recurrent lymphoma. \par \par RTC 4 months. \par

## 2021-12-16 LAB
ALBUMIN SERPL ELPH-MCNC: 4.5 G/DL
ALP BLD-CCNC: 58 U/L
ALT SERPL-CCNC: 13 U/L
ANION GAP SERPL CALC-SCNC: 11 MMOL/L
AST SERPL-CCNC: 19 U/L
BILIRUB SERPL-MCNC: 0.2 MG/DL
BUN SERPL-MCNC: 8 MG/DL
CALCIUM SERPL-MCNC: 9.8 MG/DL
CHLORIDE SERPL-SCNC: 102 MMOL/L
CO2 SERPL-SCNC: 26 MMOL/L
CREAT SERPL-MCNC: 0.81 MG/DL
GLUCOSE SERPL-MCNC: 91 MG/DL
LDH SERPL-CCNC: 164 U/L
POTASSIUM SERPL-SCNC: 4.8 MMOL/L
PROT SERPL-MCNC: 6.6 G/DL
SODIUM SERPL-SCNC: 139 MMOL/L

## 2021-12-30 ENCOUNTER — APPOINTMENT (OUTPATIENT)
Dept: PHYSICAL MEDICINE AND REHAB | Facility: CLINIC | Age: 76
End: 2021-12-30
Payer: MEDICARE

## 2021-12-30 DIAGNOSIS — N39.41 URGE INCONTINENCE: ICD-10-CM

## 2021-12-30 PROCEDURE — 99203 OFFICE O/P NEW LOW 30 MIN: CPT

## 2021-12-30 NOTE — ASSESSMENT
[FreeTextEntry1] : 76 year old female presenting for evaluation.\par \par #Urge incontinence:\par -No overt neurologic deficits on exam.  \par -She is demonstrating symptoms which seem consistent with urge incontinence.  She was previously recommended for pelvic floor therapy by her colorectal surgeon Dr. Jack Guevara.  She also follows with GI for her chronic IBS and abdominal pain, Dr. Robert Calderón.\par -She reports she has previously followed up with her GYN.\par -Recommend that she follow-up with urology or gynecologic urology.\par -Discussed with her a trial of physical therapy for pelvic floor therapy to see if this can improve symptoms.\par \par \par Follow up in 6 weeks.

## 2021-12-30 NOTE — DATA REVIEWED
[FreeTextEntry1] : PET/CT 11/26/19 \par \par 1. FDG avid left frontal scalp soft tissue thickening, consistent with biopsy-proven lymphoma. \par Underlying marrow signal abnormality seen on MRI is not definitively appreciated on PET/CT. \par 2. Minimally FDG avid 0.5 cm probable lymph node left parotid tail. \par 3. No FDG avid disease below the diaphragm.\par \par \par PET/CT 7/29/2020: No evidence of recurrent disease.

## 2021-12-30 NOTE — PHYSICAL EXAM
[FreeTextEntry1] : Gen: Patient is A&O x 3, NAD\par HEENT: EOMI, hearing grossly normal\par Resp: regular, non - labored\par CV: pulses regular\par Skin: no rashes, erythema\par Lymph: no clubbing, cyanosis, edema, or palpable lymphadenopathy\par Inspection: no instability or misalignment\par ROM: full throughout\par Sensation: intact to light touch\par Reflexes: 1+ and symmetric throughout\par Strength: 5/5 throughout\par Special tests: -Straight leg raise \par Gait: normal, non-antalgic\par \par

## 2021-12-30 NOTE — HISTORY OF PRESENT ILLNESS
[FreeTextEntry1] : Ms. Veloz is a 76 year old female with PMHx of HLP, IBS, diagnosed with diffuse large B cell lymphoma localized to the scalp.  12/19/19- S/P first cycle of R-CHOP complicated by grade 2-3 mucositis, thrush and generalized weakness.  1/9/2020- Cycle # 2 Mini-CHOP, tolerated well.  S/P cycle # 3 of Mini-CHOP on 1/30/2020. \par Third cycle was complicated by grade three mucositis, decrease appetite, not able to drink fluids, similar to S/E with R-CHOP first cycle. Chemotherapy was discontinued at this time.  Patient completed radiation therapy 20 treatments on 4/23/2020 with Dr Spencer. \par \par She reports she has had chronic symptoms of IBS and has had multiple interventions.  She has been following up with colorectal surgery Dr. Jack Guevara.  After extensive evaluations he recommended that she trial pelvic floor therapy.  She reports that she is having also episodes of urge incontinence.  She denies any radiation of pain down her legs any back pain or any new weakness.  She denies any symptoms of neuropathy from her chemo therapy treatments.  She reports she has regular follow-up with her GYN who has evaluated her for this as well.  She reports she has episodes where she feels like she frequently has to use the bathroom.  She denies any burning dysuria fevers or chills.\par \par \par \par \par

## 2022-01-20 ENCOUNTER — APPOINTMENT (OUTPATIENT)
Dept: HEMATOLOGY ONCOLOGY | Facility: CLINIC | Age: 77
End: 2022-01-20

## 2022-04-13 ENCOUNTER — TRANSCRIPTION ENCOUNTER (OUTPATIENT)
Age: 77
End: 2022-04-13

## 2022-04-21 ENCOUNTER — OUTPATIENT (OUTPATIENT)
Dept: OUTPATIENT SERVICES | Facility: HOSPITAL | Age: 77
LOS: 1 days | Discharge: HOME | End: 2022-04-21

## 2022-04-21 DIAGNOSIS — C85.90 NON-HODGKIN LYMPHOMA, UNSPECIFIED, UNSPECIFIED SITE: ICD-10-CM

## 2022-04-26 ENCOUNTER — RESULT REVIEW (OUTPATIENT)
Age: 77
End: 2022-04-26

## 2022-04-26 ENCOUNTER — APPOINTMENT (OUTPATIENT)
Dept: HEMATOLOGY ONCOLOGY | Facility: CLINIC | Age: 77
End: 2022-04-26
Payer: MEDICARE

## 2022-04-26 ENCOUNTER — APPOINTMENT (OUTPATIENT)
Dept: HEMATOLOGY ONCOLOGY | Facility: CLINIC | Age: 77
End: 2022-04-26

## 2022-04-26 VITALS
RESPIRATION RATE: 16 BRPM | OXYGEN SATURATION: 95 % | BODY MASS INDEX: 18.31 KG/M2 | TEMPERATURE: 97.7 F | DIASTOLIC BLOOD PRESSURE: 72 MMHG | HEART RATE: 74 BPM | WEIGHT: 97 LBS | HEIGHT: 60.94 IN | SYSTOLIC BLOOD PRESSURE: 125 MMHG

## 2022-04-26 LAB
ALBUMIN SERPL ELPH-MCNC: 4.2 G/DL
ALP BLD-CCNC: 64 U/L
ALT SERPL-CCNC: 8 U/L
ANION GAP SERPL CALC-SCNC: 10 MMOL/L
AST SERPL-CCNC: 15 U/L
BASOPHILS # BLD AUTO: 0.1 K/UL — SIGNIFICANT CHANGE UP (ref 0–0.2)
BASOPHILS NFR BLD AUTO: 1 % — SIGNIFICANT CHANGE UP (ref 0–2)
BILIRUB SERPL-MCNC: <0.2 MG/DL
BUN SERPL-MCNC: 13 MG/DL
CALCIUM SERPL-MCNC: 9.4 MG/DL
CHLORIDE SERPL-SCNC: 101 MMOL/L
CO2 SERPL-SCNC: 26 MMOL/L
CREAT SERPL-MCNC: 0.65 MG/DL
EGFR: 92 ML/MIN/1.73M2
EOSINOPHIL # BLD AUTO: 0.29 K/UL — SIGNIFICANT CHANGE UP (ref 0–0.5)
EOSINOPHIL NFR BLD AUTO: 2.8 % — SIGNIFICANT CHANGE UP (ref 0–6)
GLUCOSE SERPL-MCNC: 86 MG/DL
HCT VFR BLD CALC: 38.6 % — SIGNIFICANT CHANGE UP (ref 34.5–45)
HGB BLD-MCNC: 12.6 G/DL — SIGNIFICANT CHANGE UP (ref 11.5–15.5)
IMM GRANULOCYTES NFR BLD AUTO: 0.5 % — SIGNIFICANT CHANGE UP (ref 0–1.5)
LDH SERPL-CCNC: 153 U/L
LYMPHOCYTES # BLD AUTO: 2.41 K/UL — SIGNIFICANT CHANGE UP (ref 1–3.3)
LYMPHOCYTES # BLD AUTO: 23.2 % — SIGNIFICANT CHANGE UP (ref 13–44)
MCHC RBC-ENTMCNC: 29 PG — SIGNIFICANT CHANGE UP (ref 27–34)
MCHC RBC-ENTMCNC: 32.6 G/DL — SIGNIFICANT CHANGE UP (ref 32–36)
MCV RBC AUTO: 88.9 FL — SIGNIFICANT CHANGE UP (ref 80–100)
MONOCYTES # BLD AUTO: 1.03 K/UL — HIGH (ref 0–0.9)
MONOCYTES NFR BLD AUTO: 9.9 % — SIGNIFICANT CHANGE UP (ref 2–14)
NEUTROPHILS # BLD AUTO: 6.5 K/UL — SIGNIFICANT CHANGE UP (ref 1.8–7.4)
NEUTROPHILS NFR BLD AUTO: 62.6 % — SIGNIFICANT CHANGE UP (ref 43–77)
NRBC # BLD: 0 /100 WBCS — SIGNIFICANT CHANGE UP (ref 0–0)
PLATELET # BLD AUTO: 338 K/UL — SIGNIFICANT CHANGE UP (ref 150–400)
POTASSIUM SERPL-SCNC: 4.7 MMOL/L
PROT SERPL-MCNC: 6.4 G/DL
RBC # BLD: 4.34 M/UL — SIGNIFICANT CHANGE UP (ref 3.8–5.2)
RBC # FLD: 14.3 % — SIGNIFICANT CHANGE UP (ref 10.3–14.5)
SODIUM SERPL-SCNC: 136 MMOL/L
WBC # BLD: 10.38 K/UL — SIGNIFICANT CHANGE UP (ref 3.8–10.5)
WBC # FLD AUTO: 10.38 K/UL — SIGNIFICANT CHANGE UP (ref 3.8–10.5)

## 2022-04-26 PROCEDURE — 99215 OFFICE O/P EST HI 40 MIN: CPT

## 2022-04-26 NOTE — PHYSICAL EXAM
[Ambulatory and capable of all self care but unable to carry out any work activities] : Status 2- Ambulatory and capable of all self care but unable to carry out any work activities. Up and about more than 50% of waking hours [Thin] : thin [Normal] : affect appropriate [de-identified] : distended, BS increased in 4 quadrants

## 2022-04-26 NOTE — HISTORY OF PRESENT ILLNESS
[0 - No Distress] : Distress Level: 0 [80: Normal activity with effort; some signs or symptoms of disease.] : 80: Normal activity with effort; some signs or symptoms of disease.  [de-identified] : 76 yo woman with PMHx of HLP, IBS,  referred for evaluation of diffuse large B cell lymphoma. \par \par Pt complaint of  left  anterior forehead mass for several years since hitting forehead with door and states has continued to increase in size and has discomfort. denies drainage or infection. Patient developed  during the summer time increase in pressure headaches everyday and also noticed blurry vision. Denies N/V. \par \par CT scan of the head 11/6/19: c/w there is mild soft tissue prominence deep to the marker placed over the left inferior frontal scalp without a discrete mass or fluid collection.\par 10/11/2019- MRI of the brain: Left frontal calvarial / sphenoid bone lesion with extracalvarial and dural involvement suspected.  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 Ki-67 50-60%. Diffuse large B cell lymphoma can not be rule out. FISH for MYC, BCL2 and BCL6 are pending. \par Immunohistochemical stains CD20, PAX5, CD10, BCL6, BCL2 ( weak), CD23,  c-MYC (20%)\par CD 30 few cells.\par Negative CD5, cyclin D1, P53. \par CD3 stain highlights small T cells in the background.\par CD21 stain highlight few residual follicular dendritic cell meshwork. \par EBV encoded RNA negative. \par \par Bone marrow biopsy 11/21/19 negative. \par \par PET/CT 11/26/19 \par \par 1. FDG avid left frontal scalp soft tissue thickening, consistent with biopsy-proven lymphoma. \par Underlying marrow signal abnormality seen on MRI is not definitively appreciated on PET/CT. \par 2. Minimally FDG avid 0.5 cm probable lymph node left parotid tail. \par 3. No FDG avid disease below the diaphragm.\par \par \par 12/19/19- S/P first cycle of R-CHOP complicated by mucositis, thrush and generalized weakness.  [de-identified] : \par 12/19/19- S/P first cycle of R-CHOP complicated by grade 2-3  mucositis, thrush and generalized weakness. \par 1/9/2020- Cycle # 2 Mini-CHOP, tolerated well.\par S/P cycle # 3 of Mini-CHOP on 1/30/2020.   \par Third cycle was complicated by grade three mucositis, decrease appetite, not able to drink fluids, similar to S/E with R-CHOP first cycle. \par Patient completed radiation therapy 20 treatments on 4/23/2020. \par \par Patient complaints of having chronic abdominal pain, decrease appetite, has internal and external hemorrhoids (painful).  Recently, had banding of hemorrhoids done. \par 3/18/22-CT scan of the abdomen and pelvis- was negative\par \par CT scan of the brain was negative on 2/12/21. \par \par No other changes in her medical, surgical or social history since 12/14/21.

## 2022-04-26 NOTE — ASSESSMENT
[FreeTextEntry1] : \par 77 yo woman with PMHx of HLP, IBS, diagnosed with  diffuse large B cell lymphoma localized to the scalp. \par \par CT scan of the head 11/6/19: c/w there is mild soft tissue prominence deep to the marker placed over the left inferior frontal scalp without a discrete mass or fluid collection.\par 10/11/2019- MRI of the brain: Left frontal calvarial / sphenoid bone lesion with extracalvarial and dural involvement suspected. 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 Ki-67 50-60%. Diffuse large B cell lymphoma can not be rule out. FISH for MYC, BCL2 and BCL6 are pending. \par Immunohistochemical stains CD20, PAX5, CD10, BCL6, BCL2 ( weak), CD23, c-MYC (20%)\par CD 30 few cells.\par Negative CD5, cyclin D1, P53. \par CD3 stain highlights small T cells in the background.\par CD21 stain highlight few residual follicular dendritic cell meshwork. \par EBV encoded RNA negative. \par \par Bone marrow biopsy 11/21/19 negative. \par \par PET/CT 11/26/19 \par \par 1. FDG avid left frontal scalp soft tissue thickening, consistent with biopsy-proven lymphoma. \par Underlying marrow signal abnormality seen on MRI is not definitively appreciated on PET/CT. \par 2. Minimally FDG avid 0.5 cm probable lymph node left parotid tail. \par 3. No FDG avid disease below the diaphragm.\par \par Patient had an echocardiogram done 12/11/19  EF 70%\par \par 12/19/19- S/P first cycle of R-CHOP complicated by grade 2-3  mucositis, thrush and generalized weakness. \par 1/9/2020- Cycle # 2 Mini-CHOP, tolerated well.\par S/P cycle # 3 of Mini-CHOP on 1/30/2020.   \par Third cycle was complicated by grade three mucositis, decrease appetite, not able to drink fluids, similar to S/E with R-CHOP first cycle. Chemotherapy was discontinued at this time. \par Patient completed radiation therapy 20 treatments on 4/23/2020 with Dr Spencer. \par \par PET/CT 7/29/2020: No evidence of recurrent disease. \par \par CT scan of the brain 2/17/21: No hemorrhage, No abnormal enhancement. Sclerotic appearance of the left frontal calvarium in the region of the previous abnormal signal on 10/11/2019 which may be related to treated lymphoma. No extra axial or extracalvarial abnormal soft tissue. \par \par GI- Patient complaints of having chronic abdominal pain, decrease appetite, has internal and external hemorrhoids (painful).  Following with colorectal surgeon next month. On Percocet twice daily. \par \par 8/5/21-PET/CT: No evidence of FDG avid disease/recurrent lymphoma. \par \par Patient complaints of having chronic abdominal pain, decrease appetite, has internal and external hemorrhoids (painful).  Recently, had banding of hemorrhoids done. \par 3/18/22-CT scan of the abdomen and pelvis- was negative\par \par No evidence of recurrent lymphoma, asymptomatic. \par \par RTC 6 months. \par

## 2022-06-22 ENCOUNTER — TRANSCRIPTION ENCOUNTER (OUTPATIENT)
Age: 77
End: 2022-06-22

## 2022-07-07 ENCOUNTER — NON-APPOINTMENT (OUTPATIENT)
Age: 77
End: 2022-07-07

## 2022-07-18 ENCOUNTER — OUTPATIENT (OUTPATIENT)
Dept: OUTPATIENT SERVICES | Facility: HOSPITAL | Age: 77
LOS: 1 days | Discharge: HOME | End: 2022-07-18

## 2022-07-18 DIAGNOSIS — C85.90 NON-HODGKIN LYMPHOMA, UNSPECIFIED, UNSPECIFIED SITE: ICD-10-CM

## 2022-07-28 ENCOUNTER — APPOINTMENT (OUTPATIENT)
Dept: HEMATOLOGY ONCOLOGY | Facility: CLINIC | Age: 77
End: 2022-07-28

## 2022-07-28 VITALS
SYSTOLIC BLOOD PRESSURE: 117 MMHG | TEMPERATURE: 96.4 F | BODY MASS INDEX: 19.01 KG/M2 | RESPIRATION RATE: 16 BRPM | OXYGEN SATURATION: 97 % | DIASTOLIC BLOOD PRESSURE: 75 MMHG | HEIGHT: 60.43 IN | WEIGHT: 98.08 LBS | HEART RATE: 80 BPM

## 2022-07-28 PROCEDURE — 99205 OFFICE O/P NEW HI 60 MIN: CPT

## 2022-07-28 RX ORDER — CHLORDIAZEPOXIDE HYDROCHLORIDE AND CLIDINIUM BROMIDE 5; 2.5 MG/1; MG/1
5-2.5 CAPSULE, GELATIN COATED ORAL
Qty: 270 | Refills: 0 | Status: DISCONTINUED | COMMUNITY
Start: 2022-06-10 | End: 2022-07-28

## 2022-07-29 ENCOUNTER — TRANSCRIPTION ENCOUNTER (OUTPATIENT)
Age: 77
End: 2022-07-29

## 2022-07-29 RX ORDER — TRAMADOL HYDROCHLORIDE 50 MG/1
50 TABLET, COATED ORAL
Qty: 60 | Refills: 2 | Status: DISCONTINUED | COMMUNITY
Start: 2022-04-26 | End: 2022-07-29

## 2022-08-02 NOTE — HISTORY OF PRESENT ILLNESS
[FreeTextEntry1] : 76yoF with DLBCL presents for initial palliative care visit, referred by Dr. Sanderson. \par PMH significant for hyperlipidemia and IBS-C, squamous cell ca of skin. \par \par Patient presentd to medical attention in 11/2019 with an enlarging soft tissue mass of the forehead, bx revealed DLBCL.  She underwent treatment with R-CHOP and RT.\par \par Main reason for which patient is referred is symptom management.\par \par She reports daily, chronic abdominal pain that spans the entire length of her abdomen down to her rectum. The pain wakes her up out of sleep several times per night. The pain is described as cramping in quality, is associated with bloating. The thought is that the chemotherapy she received for the DLBCL triggered her pain to worsen. \par Patient reports that for her chronic abdominal pain she was previously on Percocet 5/325, this was tapered off in 4/2022 as she was concerned about becoming dependent on it.  She was recently started on Tramadol 50mg by Dr. Sanderson which pt has been using at a max of twice daily (she typically takes a second pill two hours after her first pill as the 50mg alone does not do enough) . She finds the tramadol 100mg to help mildly.  Of note, she was previously on anti-spasmodics without any relief. \par Was recently prescribed gabapentin 300mg QHS for the abdominal pain and she states that she felt as if she were hallucinating and completely out of it. \par She uses small doses of smoked cannabis on a regular basis, this helps mildly in terms of providing her some muscle relaxation in the abdomen and rectum. \par \par ROS:\par +decreased appetite \par +constipation - Has been on multiple agents - amitiza, linzess, trulance. None seemed to be very effective. She presently uses a fiber supplement and is careful about eating binding foods. Has a BM every 2-3 days, stools are thin and hard. She sees her GI regularly. \par +depression/anxiety - on celexa 40mg QD, alprazolam 0.5mg PRN (usually takes one in the AM and one overnight when she wakes up from pain)\par +hemorrhoids - has undergone banding in past. She uses lidocaine cream PRN\par +rectal prolapse - is going for pelvic floor PT once weekly. \par +urinary frequency - is on Myrbetric \par \par Patient is , lives alone. She has a long-term boyfriend who is very involved in her life. She has two involved sons one nearby and one in the [facility]. She enjoys painting and writing poetry. She describes herself as having a young soul and enjoys doing many things in her life. She is independent in her ADLs, drives herself. \par \par PMD/GI - Dr. Robert Calderón\par \par I-STOP Ref#:  [id number]

## 2022-08-02 NOTE — PHYSICAL EXAM
[General Appearance - Alert] : alert [General Appearance - In No Acute Distress] : in no acute distress [Sclera] : the sclera and conjunctiva were normal [Normal Oral Mucosa] : normal oral mucosa [Neck Appearance] : the appearance of the neck was normal [Heart Rate And Rhythm] : heart rate was normal and rhythm regular [Heart Sounds] : normal S1 and S2 [Edema] : there was no peripheral edema [Bowel Sounds] : normal bowel sounds [Abdomen Soft] : soft [Abdomen Tenderness] : non-tender [Abnormal Walk] : normal gait [Skin Color & Pigmentation] : normal skin color and pigmentation [No Focal Deficits] : no focal deficits [Oriented To Time, Place, And Person] : oriented to person, place, and time [Affect] : the affect was normal

## 2022-08-02 NOTE — ASSESSMENT
[FreeTextEntry1] : 76yoF with:\par \par # DLBCL -  s/p R-CHOP & RT in 2020. Follow up with Hematology. \par \par # Chronic abdominal pain - Etiology of pain not entirely clear, could be related to her IBS, patient finds that the pain has been exacerbated since chemotherapy.  Gabapentin 300mg was too strong, will have her start 100mg QHS x 1 week then increase to 100mg BID. \par Medical cannabis certification completed today. Provided cannabis education, overview of state program, and discussed adverse effects in detail. Counseled that vaporized cannabis is not the preferred route of administration due to the fact that both short-term and long-term risks associated with vaporizing oils are not yet fully understood. \par \par # Constipation - On daily fiber and controlling constipation with diet. \par \par # Depression/Anxiety - On regimen of Celexa and Alprazolam, mood is controlled.\par \par # Encounter for palliative care- Emotional support provided \par \par Follow up in 1 month, call sooner with questions or issues. \par  [de-identified] : HCP: Rodolfo Cantor [phone]; Varun Cantor [phone]

## 2022-08-04 ENCOUNTER — NON-APPOINTMENT (OUTPATIENT)
Age: 77
End: 2022-08-04

## 2022-08-08 ENCOUNTER — TRANSCRIPTION ENCOUNTER (OUTPATIENT)
Age: 77
End: 2022-08-08

## 2022-09-08 RX ORDER — GABAPENTIN 100 MG/1
100 CAPSULE ORAL
Qty: 30 | Refills: 2 | Status: DISCONTINUED | COMMUNITY
Start: 2022-07-28 | End: 2022-09-08

## 2022-09-08 RX ORDER — GABAPENTIN 300 MG/1
300 CAPSULE ORAL
Qty: 30 | Refills: 6 | Status: DISCONTINUED | COMMUNITY
Start: 2022-07-06 | End: 2022-09-08

## 2022-09-12 ENCOUNTER — APPOINTMENT (OUTPATIENT)
Dept: HEMATOLOGY ONCOLOGY | Facility: CLINIC | Age: 77
End: 2022-09-12

## 2022-09-12 ENCOUNTER — TRANSCRIPTION ENCOUNTER (OUTPATIENT)
Age: 77
End: 2022-09-12

## 2022-09-12 PROCEDURE — 99212 OFFICE O/P EST SF 10 MIN: CPT | Mod: 95

## 2022-09-12 NOTE — HISTORY OF PRESENT ILLNESS
[Home] : at home, [unfilled] , at the time of the visit. [Medical Office: ([facility])___] : at the medical office located in  [Verbal consent obtained from patient] : the patient, [unfilled] [FreeTextEntry1] : 76yoF with DLBCL presents for follow-up palliative care visit, referred by Dr. Sanderson. \par PMH significant for hyperlipidemia and IBS-C, squamous cell ca of skin. \par \par Patient presented to medical attention in 11/2019 with an enlarging soft tissue mass of the forehead, bx revealed DLBCL.  She underwent treatment with R-CHOP and RT.\par \par Main reason for which patient is referred is symptom management.\par \par She reports daily, chronic abdominal pain that spans the entire length of her abdomen down to her rectum. The pain wakes her up out of sleep several times per night. The pain is described as cramping in quality, is associated with bloating. The thought is that the chemotherapy she received for the DLBCL triggered her pain to worsen. \par Patient reports that for her chronic abdominal pain she was previously on Percocet 5/325, this was tapered off in 4/2022 as she was concerned about becoming dependent on it.  She was recently started on Tramadol 50mg by Dr. Sanderson which pt has been using at a max of twice daily (she typically takes a second pill two hours after her first pill as the 50mg alone does not do enough) . She finds the tramadol 100mg to help mildly.  Of note, she was previously on anti-spasmodics without any relief. \par Was recently prescribed gabapentin 300mg QHS for the abdominal pain and she states that she felt as if she were hallucinating and completely out of it. \par She uses small doses of smoked cannabis on a regular basis, this helps mildly in terms of providing her some muscle relaxation in the abdomen and rectum. \par \par Interval History (9/12/2022):\par Patient is seen for follow up via telemedicine. \par Her chronic abdominal pain is improved. Pain previously 9/10 and is now decreased to 4/10. She is taking PRN Tramadol 100mg in the morning and using cannabis in the afternoon for pain relief. She recently received her medical cannabis card and plans to go to the dispensary moving forward.  She follows with Dr Lazcano for her IBS-C, she began Linzess 145mcg three days ago. Is asking if she can have a plan to taper tramadol given her improvement in pain. \par \par Of note, gabapentin caused panic, nausea, and palpitations. No longer using. \par \par ROS:\par +decreased appetite \par +constipation - Has been on multiple agents - amitiza, linzess, trulance. None seemed to be very effective. She presently uses a fiber supplement and is careful about eating binding foods. Has a BM every 2-3 days, stools are thin and hard. She sees her GI regularly. \par +depression/anxiety - on celexa 40mg QD, alprazolam 0.5mg PRN (usually takes one in the AM and one overnight when she wakes up from pain)\par +hemorrhoids - has undergone banding in past. She uses lidocaine cream PRN\par +rectal prolapse - is going for pelvic floor PT once weekly. \par +urinary frequency - is on Myrbetric \par \par Patient is , lives alone. She has a long-term boyfriend who is very involved in her life. She has two involved sons one nearby and one in the [facility]. She enjoys painting and writing poetry. She describes herself as having a young soul and enjoys doing many things in her life. She is independent in her ADLs, drives herself. \par \par PMD/GI - Dr. Robert Calderón\par \par I-STOP Ref#:  [id number]

## 2022-09-12 NOTE — ASSESSMENT
[FreeTextEntry1] : 76yoF with:\par \par # DLBCL -  s/p R-CHOP & RT in 2020. Follow up with Hematology. \par \par # Chronic abdominal pain - Etiology of pain not entirely clear, could be related to her IBS, patient finds that the pain has been exacerbated since chemotherapy.  \par -C/w PRN tramadol; plan to taper down tramadol. Provided instructions to patient to cut down to 50mg QD x 1 week then 25mg QD x 1 week then stop, as long as tolerated. \par -Medical cannabis certified. Plans to go to the dispensary and continue with cannabis or pain relief. \par \par # Constipation - C/w Linzess per GI. On daily fiber and controlling constipation with diet. \par \par # Depression/Anxiety - On regimen of Celexa and Alprazolam, mood is controlled.\par \par # Encounter for palliative care- Emotional support provided \par \par Follow up in PRN, call with questions or issues. \par  [de-identified] : HCP: Rodolfo Cantor [phone]; Varun Cantor [phone]

## 2022-10-20 ENCOUNTER — OUTPATIENT (OUTPATIENT)
Dept: OUTPATIENT SERVICES | Facility: HOSPITAL | Age: 77
LOS: 1 days | Discharge: HOME | End: 2022-10-20

## 2022-10-20 DIAGNOSIS — C85.90 NON-HODGKIN LYMPHOMA, UNSPECIFIED, UNSPECIFIED SITE: ICD-10-CM

## 2022-11-10 ENCOUNTER — RESULT REVIEW (OUTPATIENT)
Age: 77
End: 2022-11-10

## 2022-11-10 ENCOUNTER — APPOINTMENT (OUTPATIENT)
Dept: HEMATOLOGY ONCOLOGY | Facility: CLINIC | Age: 77
End: 2022-11-10

## 2022-11-10 VITALS
RESPIRATION RATE: 16 BRPM | TEMPERATURE: 97.2 F | OXYGEN SATURATION: 95 % | HEIGHT: 60.43 IN | WEIGHT: 98.08 LBS | DIASTOLIC BLOOD PRESSURE: 76 MMHG | HEART RATE: 80 BPM | BODY MASS INDEX: 19.01 KG/M2 | SYSTOLIC BLOOD PRESSURE: 122 MMHG

## 2022-11-10 LAB
BASOPHILS # BLD AUTO: 0.12 K/UL — SIGNIFICANT CHANGE UP (ref 0–0.2)
BASOPHILS NFR BLD AUTO: 1.1 % — SIGNIFICANT CHANGE UP (ref 0–2)
EOSINOPHIL # BLD AUTO: 0.35 K/UL — SIGNIFICANT CHANGE UP (ref 0–0.5)
EOSINOPHIL NFR BLD AUTO: 3.2 % — SIGNIFICANT CHANGE UP (ref 0–6)
HCT VFR BLD CALC: 38.9 % — SIGNIFICANT CHANGE UP (ref 34.5–45)
HGB BLD-MCNC: 12.7 G/DL — SIGNIFICANT CHANGE UP (ref 11.5–15.5)
IMM GRANULOCYTES NFR BLD AUTO: 0.4 % — SIGNIFICANT CHANGE UP (ref 0–0.9)
LYMPHOCYTES # BLD AUTO: 28.3 % — SIGNIFICANT CHANGE UP (ref 13–44)
LYMPHOCYTES # BLD AUTO: 3.12 K/UL — SIGNIFICANT CHANGE UP (ref 1–3.3)
MCHC RBC-ENTMCNC: 28.3 PG — SIGNIFICANT CHANGE UP (ref 27–34)
MCHC RBC-ENTMCNC: 32.6 G/DL — SIGNIFICANT CHANGE UP (ref 32–36)
MCV RBC AUTO: 86.6 FL — SIGNIFICANT CHANGE UP (ref 80–100)
MONOCYTES # BLD AUTO: 1.06 K/UL — HIGH (ref 0–0.9)
MONOCYTES NFR BLD AUTO: 9.6 % — SIGNIFICANT CHANGE UP (ref 2–14)
NEUTROPHILS # BLD AUTO: 6.34 K/UL — SIGNIFICANT CHANGE UP (ref 1.8–7.4)
NEUTROPHILS NFR BLD AUTO: 57.4 % — SIGNIFICANT CHANGE UP (ref 43–77)
NRBC # BLD: 0 /100 WBCS — SIGNIFICANT CHANGE UP (ref 0–0)
PLATELET # BLD AUTO: 331 K/UL — SIGNIFICANT CHANGE UP (ref 150–400)
RBC # BLD: 4.49 M/UL — SIGNIFICANT CHANGE UP (ref 3.8–5.2)
RBC # FLD: 14.5 % — SIGNIFICANT CHANGE UP (ref 10.3–14.5)
WBC # BLD: 11.03 K/UL — HIGH (ref 3.8–10.5)
WBC # FLD AUTO: 11.03 K/UL — HIGH (ref 3.8–10.5)

## 2022-11-10 PROCEDURE — 99215 OFFICE O/P EST HI 40 MIN: CPT

## 2022-11-10 NOTE — PHYSICAL EXAM
[Ambulatory and capable of all self care but unable to carry out any work activities] : Status 2- Ambulatory and capable of all self care but unable to carry out any work activities. Up and about more than 50% of waking hours [Thin] : thin [Normal] : affect appropriate [de-identified] : distended, BS increased in 4 quadrants

## 2022-11-10 NOTE — ASSESSMENT
[FreeTextEntry1] : \par 75 yo woman with PMHx of HLP, IBS, diagnosed with  diffuse large B cell lymphoma localized to the scalp. \par \par CT scan of the head 11/6/19: c/w there is mild soft tissue prominence deep to the marker placed over the left inferior frontal scalp without a discrete mass or fluid collection.\par 10/11/2019- MRI of the brain: Left frontal calvarial / sphenoid bone lesion with extracalvarial and dural involvement suspected. 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 Ki-67 50-60%. Diffuse large B cell lymphoma can not be rule out. FISH for MYC, BCL2 and BCL6 are pending. \par Immunohistochemical stains CD20, PAX5, CD10, BCL6, BCL2 ( weak), CD23, c-MYC (20%)\par CD 30 few cells.\par Negative CD5, cyclin D1, P53. \par CD3 stain highlights small T cells in the background.\par CD21 stain highlight few residual follicular dendritic cell meshwork. \par EBV encoded RNA negative. \par \par Bone marrow biopsy 11/21/19 negative. \par \par PET/CT 11/26/19 \par \par 1. FDG avid left frontal scalp soft tissue thickening, consistent with biopsy-proven lymphoma. \par Underlying marrow signal abnormality seen on MRI is not definitively appreciated on PET/CT. \par 2. Minimally FDG avid 0.5 cm probable lymph node left parotid tail. \par 3. No FDG avid disease below the diaphragm.\par \par Patient had an echocardiogram done 12/11/19  EF 70%\par \par 12/19/19- S/P first cycle of R-CHOP complicated by grade 2-3  mucositis, thrush and generalized weakness. \par 1/9/2020- Cycle # 2 Mini-CHOP, tolerated well.\par S/P cycle # 3 of Mini-CHOP on 1/30/2020.   \par Third cycle was complicated by grade three mucositis, decrease appetite, not able to drink fluids, similar to S/E with R-CHOP first cycle. Chemotherapy was discontinued at this time. \par Patient completed radiation therapy 20 treatments on 4/23/2020 with Dr Spencer. \par \par PET/CT 7/29/2020: No evidence of recurrent disease. \par \par CT scan of the brain 2/17/21: No hemorrhage, No abnormal enhancement. Sclerotic appearance of the left frontal calvarium in the region of the previous abnormal signal on 10/11/2019 which may be related to treated lymphoma. No extra axial or extracalvarial abnormal soft tissue. \par \par GI- Patient complaints of having chronic abdominal pain, decrease appetite, has external hemorrhoids (painful).  Following with colorectal surgeon. On Tramadol 50 mg TID. \par \par 8/5/21-PET/CT: No evidence of FDG avid disease/recurrent lymphoma. \par \par 3/18/22-CT scan of the abdomen and pelvis- was negative.\par \par No evidence of recurrent lymphoma, asymptomatic. \par \par RTC 6 months. \par

## 2022-11-10 NOTE — REVIEW OF SYSTEMS
[Abdominal Pain] : abdominal pain [Constipation] : constipation [Anxiety] : anxiety [Negative] : Allergic/Immunologic [Fatigue] : no fatigue [FreeTextEntry7] : BRBPR

## 2022-11-11 LAB
ALBUMIN SERPL ELPH-MCNC: 4.2 G/DL
ALP BLD-CCNC: 65 U/L
ALT SERPL-CCNC: 11 U/L
ANION GAP SERPL CALC-SCNC: 11 MMOL/L
AST SERPL-CCNC: 18 U/L
BILIRUB SERPL-MCNC: 0.3 MG/DL
BUN SERPL-MCNC: 11 MG/DL
CALCIUM SERPL-MCNC: 9.8 MG/DL
CHLORIDE SERPL-SCNC: 100 MMOL/L
CO2 SERPL-SCNC: 26 MMOL/L
CREAT SERPL-MCNC: 0.79 MG/DL
EGFR: 78 ML/MIN/1.73M2
GLUCOSE SERPL-MCNC: 95 MG/DL
LDH SERPL-CCNC: 159 U/L
POTASSIUM SERPL-SCNC: 4.3 MMOL/L
PROT SERPL-MCNC: 6.7 G/DL
SODIUM SERPL-SCNC: 137 MMOL/L

## 2023-01-04 ENCOUNTER — TRANSCRIPTION ENCOUNTER (OUTPATIENT)
Age: 78
End: 2023-01-04

## 2023-02-12 ENCOUNTER — OUTPATIENT (OUTPATIENT)
Dept: OUTPATIENT SERVICES | Facility: HOSPITAL | Age: 78
LOS: 1 days | Discharge: HOME | End: 2023-02-12

## 2023-02-12 DIAGNOSIS — C85.90 NON-HODGKIN LYMPHOMA, UNSPECIFIED, UNSPECIFIED SITE: ICD-10-CM

## 2023-02-21 ENCOUNTER — APPOINTMENT (OUTPATIENT)
Dept: HEMATOLOGY ONCOLOGY | Facility: CLINIC | Age: 78
End: 2023-02-21
Payer: MEDICARE

## 2023-02-21 ENCOUNTER — RESULT REVIEW (OUTPATIENT)
Age: 78
End: 2023-02-21

## 2023-02-21 VITALS
RESPIRATION RATE: 16 BRPM | DIASTOLIC BLOOD PRESSURE: 79 MMHG | OXYGEN SATURATION: 96 % | HEIGHT: 60.43 IN | TEMPERATURE: 97.6 F | SYSTOLIC BLOOD PRESSURE: 131 MMHG | BODY MASS INDEX: 21.36 KG/M2 | HEART RATE: 86 BPM | WEIGHT: 110.23 LBS

## 2023-02-21 LAB
ALBUMIN SERPL ELPH-MCNC: 4 G/DL
ALP BLD-CCNC: 75 U/L
ALT SERPL-CCNC: 13 U/L
ANION GAP SERPL CALC-SCNC: 10 MMOL/L
AST SERPL-CCNC: 17 U/L
BASOPHILS # BLD AUTO: 0.12 K/UL — SIGNIFICANT CHANGE UP (ref 0–0.2)
BASOPHILS NFR BLD AUTO: 1.2 % — SIGNIFICANT CHANGE UP (ref 0–2)
BILIRUB SERPL-MCNC: <0.2 MG/DL
BUN SERPL-MCNC: 7 MG/DL
CALCIUM SERPL-MCNC: 9.4 MG/DL
CHLORIDE SERPL-SCNC: 103 MMOL/L
CO2 SERPL-SCNC: 24 MMOL/L
CREAT SERPL-MCNC: 0.76 MG/DL
EGFR: 82 ML/MIN/1.73M2
EOSINOPHIL # BLD AUTO: 0.25 K/UL — SIGNIFICANT CHANGE UP (ref 0–0.5)
EOSINOPHIL NFR BLD AUTO: 2.5 % — SIGNIFICANT CHANGE UP (ref 0–6)
GLUCOSE SERPL-MCNC: 78 MG/DL
HCT VFR BLD CALC: 38.5 % — SIGNIFICANT CHANGE UP (ref 34.5–45)
HGB BLD-MCNC: 12.5 G/DL — SIGNIFICANT CHANGE UP (ref 11.5–15.5)
IMM GRANULOCYTES NFR BLD AUTO: 0.6 % — SIGNIFICANT CHANGE UP (ref 0–0.9)
LDH SERPL-CCNC: 159 U/L
LYMPHOCYTES # BLD AUTO: 2.27 K/UL — SIGNIFICANT CHANGE UP (ref 1–3.3)
LYMPHOCYTES # BLD AUTO: 22.8 % — SIGNIFICANT CHANGE UP (ref 13–44)
MCHC RBC-ENTMCNC: 28 PG — SIGNIFICANT CHANGE UP (ref 27–34)
MCHC RBC-ENTMCNC: 32.5 G/DL — SIGNIFICANT CHANGE UP (ref 32–36)
MCV RBC AUTO: 86.1 FL — SIGNIFICANT CHANGE UP (ref 80–100)
MONOCYTES # BLD AUTO: 1.02 K/UL — HIGH (ref 0–0.9)
MONOCYTES NFR BLD AUTO: 10.2 % — SIGNIFICANT CHANGE UP (ref 2–14)
NEUTROPHILS # BLD AUTO: 6.24 K/UL — SIGNIFICANT CHANGE UP (ref 1.8–7.4)
NEUTROPHILS NFR BLD AUTO: 62.7 % — SIGNIFICANT CHANGE UP (ref 43–77)
NRBC # BLD: 0 /100 WBCS — SIGNIFICANT CHANGE UP (ref 0–0)
PLATELET # BLD AUTO: 334 K/UL — SIGNIFICANT CHANGE UP (ref 150–400)
POTASSIUM SERPL-SCNC: 4.9 MMOL/L
PROT SERPL-MCNC: 6.4 G/DL
RBC # BLD: 4.47 M/UL — SIGNIFICANT CHANGE UP (ref 3.8–5.2)
RBC # FLD: 15.1 % — HIGH (ref 10.3–14.5)
SODIUM SERPL-SCNC: 137 MMOL/L
WBC # BLD: 9.96 K/UL — SIGNIFICANT CHANGE UP (ref 3.8–10.5)
WBC # FLD AUTO: 9.96 K/UL — SIGNIFICANT CHANGE UP (ref 3.8–10.5)

## 2023-02-21 PROCEDURE — 99215 OFFICE O/P EST HI 40 MIN: CPT

## 2023-02-21 NOTE — PHYSICAL EXAM
[Ambulatory and capable of all self care but unable to carry out any work activities] : Status 2- Ambulatory and capable of all self care but unable to carry out any work activities. Up and about more than 50% of waking hours [Thin] : thin [Normal] : affect appropriate [de-identified] : distended, BS increased in 4 quadrants

## 2023-02-21 NOTE — ASSESSMENT
[FreeTextEntry1] : \par 76 yo woman with PMHx of HLP, IBS, diagnosed with  diffuse large B cell lymphoma localized to the scalp. \par \par CT scan of the head 11/6/19: c/w there is mild soft tissue prominence deep to the marker placed over the left inferior frontal scalp without a discrete mass or fluid collection.\par 10/11/2019- MRI of the brain: Left frontal calvarial / sphenoid bone lesion with extracalvarial and dural involvement suspected. 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 Ki-67 50-60%. Diffuse large B cell lymphoma can not be rule out. FISH for MYC, BCL2 and BCL6 are pending. \par Immunohistochemical stains CD20, PAX5, CD10, BCL6, BCL2 ( weak), CD23, c-MYC (20%)\par CD 30 few cells.\par Negative CD5, cyclin D1, P53. \par CD3 stain highlights small T cells in the background.\par CD21 stain highlight few residual follicular dendritic cell meshwork. \par EBV encoded RNA negative. \par \par Bone marrow biopsy 11/21/19 negative. \par \par PET/CT 11/26/19 \par \par 1. FDG avid left frontal scalp soft tissue thickening, consistent with biopsy-proven lymphoma. \par Underlying marrow signal abnormality seen on MRI is not definitively appreciated on PET/CT. \par 2. Minimally FDG avid 0.5 cm probable lymph node left parotid tail. \par 3. No FDG avid disease below the diaphragm.\par \par Patient had an echocardiogram done 12/11/19  EF 70%\par \par 12/19/19- S/P first cycle of R-CHOP complicated by grade 2-3  mucositis, thrush and generalized weakness. \par 1/9/2020- Cycle # 2 Mini-CHOP, tolerated well.\par S/P cycle # 3 of Mini-CHOP on 1/30/2020.   \par Third cycle was complicated by grade three mucositis, decrease appetite, not able to drink fluids, similar to S/E with R-CHOP first cycle. Chemotherapy was discontinued at this time. \par Patient completed radiation therapy 20 treatments on 4/23/2020 with Dr Spencer. \par \par PET/CT 7/29/2020: No evidence of recurrent disease. \par \par CT scan of the brain 2/17/21: No hemorrhage, No abnormal enhancement. Sclerotic appearance of the left frontal calvarium in the region of the previous abnormal signal on 10/11/2019 which may be related to treated lymphoma. No extra axial or extracalvarial abnormal soft tissue. \par \par GI- Patient complaints of having chronic abdominal pain, decrease appetite, has external hemorrhoids (painful).  Following with colorectal surgeon. On Tramadol 50 mg TID. Patient was started by Dr Candelaria PCP on Amitriptyline 25 mg at bedtime with significant improvement of symptoms. Patient gained 5-6 Kg since 11/2022. \par \par 8/5/21-PET/CT: No evidence of FDG avid disease/recurrent lymphoma. \par \par 3/18/22-CT scan of the abdomen and pelvis- was negative.\par \par No evidence of recurrent lymphoma, asymptomatic. \par \par RTC 6 months. \par

## 2023-02-21 NOTE — HISTORY OF PRESENT ILLNESS
[0 - No Distress] : Distress Level: 0 [80: Normal activity with effort; some signs or symptoms of disease.] : 80: Normal activity with effort; some signs or symptoms of disease.  [de-identified] : 74 yo woman with PMHx of HLP, IBS,  referred for evaluation of diffuse large B cell lymphoma. \par \par Pt complaint of  left  anterior forehead mass for several years since hitting forehead with door and states has continued to increase in size and has discomfort. denies drainage or infection. Patient developed  during the summer time increase in pressure headaches everyday and also noticed blurry vision. Denies N/V. \par \par CT scan of the head 11/6/19: c/w there is mild soft tissue prominence deep to the marker placed over the left inferior frontal scalp without a discrete mass or fluid collection.\par 10/11/2019- MRI of the brain: Left frontal calvarial / sphenoid bone lesion with extracalvarial and dural involvement suspected.  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 Ki-67 50-60%. Diffuse large B cell lymphoma can not be rule out. FISH for MYC, BCL2 and BCL6 are pending. \par Immunohistochemical stains CD20, PAX5, CD10, BCL6, BCL2 ( weak), CD23,  c-MYC (20%)\par CD 30 few cells.\par Negative CD5, cyclin D1, P53. \par CD3 stain highlights small T cells in the background.\par CD21 stain highlight few residual follicular dendritic cell meshwork. \par EBV encoded RNA negative. \par \par Bone marrow biopsy 11/21/19 negative. \par \par PET/CT 11/26/19 \par \par 1. FDG avid left frontal scalp soft tissue thickening, consistent with biopsy-proven lymphoma. \par Underlying marrow signal abnormality seen on MRI is not definitively appreciated on PET/CT. \par 2. Minimally FDG avid 0.5 cm probable lymph node left parotid tail. \par 3. No FDG avid disease below the diaphragm.\par \par \par 12/19/19- S/P first cycle of R-CHOP complicated by mucositis, thrush and generalized weakness. \par 1/9/2020- Cycle # 2 Mini-CHOP, tolerated well.\par S/P cycle # 3 of Mini-CHOP on 1/30/2020.   \par Third cycle was complicated by grade three mucositis, decrease appetite, not able to drink fluids, similar to S/E with R-CHOP first cycle. \par Patient completed radiation therapy 20 treatments on 4/23/2020.  [de-identified] : \par \par Patient has internal and external hemorrhoids (painful).  S/P banding of hemorrhoids done. \par 3/18/22-CT scan of the abdomen and pelvis- was negative\par \par CT scan of the brain was negative on 2/12/21. \par \par No other changes in her medical, surgical or social history since 11/10/22.

## 2023-03-13 ENCOUNTER — TRANSCRIPTION ENCOUNTER (OUTPATIENT)
Age: 78
End: 2023-03-13

## 2023-03-27 ENCOUNTER — NON-APPOINTMENT (OUTPATIENT)
Age: 78
End: 2023-03-27

## 2023-06-28 ENCOUNTER — TRANSCRIPTION ENCOUNTER (OUTPATIENT)
Age: 78
End: 2023-06-28

## 2023-06-29 ENCOUNTER — TRANSCRIPTION ENCOUNTER (OUTPATIENT)
Age: 78
End: 2023-06-29

## 2023-07-03 ENCOUNTER — TRANSCRIPTION ENCOUNTER (OUTPATIENT)
Age: 78
End: 2023-07-03

## 2023-08-03 ENCOUNTER — TRANSCRIPTION ENCOUNTER (OUTPATIENT)
Age: 78
End: 2023-08-03

## 2023-08-15 ENCOUNTER — TRANSCRIPTION ENCOUNTER (OUTPATIENT)
Age: 78
End: 2023-08-15

## 2023-09-21 ENCOUNTER — OUTPATIENT (OUTPATIENT)
Dept: OUTPATIENT SERVICES | Facility: HOSPITAL | Age: 78
LOS: 1 days | Discharge: HOME | End: 2023-09-21

## 2023-09-21 DIAGNOSIS — C85.90 NON-HODGKIN LYMPHOMA, UNSPECIFIED, UNSPECIFIED SITE: ICD-10-CM

## 2023-10-03 ENCOUNTER — TRANSCRIPTION ENCOUNTER (OUTPATIENT)
Age: 78
End: 2023-10-03

## 2023-10-12 ENCOUNTER — EMERGENCY (EMERGENCY)
Facility: HOSPITAL | Age: 78
LOS: 1 days | Discharge: HOME | End: 2023-10-12
Attending: EMERGENCY MEDICINE
Payer: MEDICARE

## 2023-10-12 VITALS
OXYGEN SATURATION: 98 % | SYSTOLIC BLOOD PRESSURE: 137 MMHG | RESPIRATION RATE: 20 BRPM | DIASTOLIC BLOOD PRESSURE: 64 MMHG | HEART RATE: 68 BPM

## 2023-10-12 VITALS
SYSTOLIC BLOOD PRESSURE: 161 MMHG | TEMPERATURE: 97 F | RESPIRATION RATE: 18 BRPM | HEIGHT: 61 IN | WEIGHT: 85.1 LBS | OXYGEN SATURATION: 100 % | HEART RATE: 85 BPM | DIASTOLIC BLOOD PRESSURE: 97 MMHG

## 2023-10-12 LAB
ALBUMIN SERPL ELPH-MCNC: 4.1 G/DL — SIGNIFICANT CHANGE UP (ref 3.3–5.2)
ALP SERPL-CCNC: 48 U/L — SIGNIFICANT CHANGE UP (ref 40–120)
ALT FLD-CCNC: 11 U/L — SIGNIFICANT CHANGE UP
ANION GAP SERPL CALC-SCNC: 13 MMOL/L — SIGNIFICANT CHANGE UP (ref 5–17)
APPEARANCE UR: ABNORMAL
AST SERPL-CCNC: 17 U/L — SIGNIFICANT CHANGE UP
BACTERIA # UR AUTO: ABNORMAL
BASOPHILS # BLD AUTO: 0.07 K/UL — SIGNIFICANT CHANGE UP (ref 0–0.2)
BASOPHILS NFR BLD AUTO: 0.7 % — SIGNIFICANT CHANGE UP (ref 0–2)
BILIRUB SERPL-MCNC: 0.3 MG/DL — LOW (ref 0.4–2)
BILIRUB UR-MCNC: NEGATIVE — SIGNIFICANT CHANGE UP
BUN SERPL-MCNC: 11.8 MG/DL — SIGNIFICANT CHANGE UP (ref 8–20)
CALCIUM SERPL-MCNC: 9.7 MG/DL — SIGNIFICANT CHANGE UP (ref 8.4–10.5)
CHLORIDE SERPL-SCNC: 101 MMOL/L — SIGNIFICANT CHANGE UP (ref 96–108)
CO2 SERPL-SCNC: 24 MMOL/L — SIGNIFICANT CHANGE UP (ref 22–29)
COLOR SPEC: YELLOW — SIGNIFICANT CHANGE UP
CREAT SERPL-MCNC: 0.61 MG/DL — SIGNIFICANT CHANGE UP (ref 0.5–1.3)
DIFF PNL FLD: NEGATIVE — SIGNIFICANT CHANGE UP
EGFR: 93 ML/MIN/1.73M2 — SIGNIFICANT CHANGE UP
EOSINOPHIL # BLD AUTO: 0.07 K/UL — SIGNIFICANT CHANGE UP (ref 0–0.5)
EOSINOPHIL NFR BLD AUTO: 0.7 % — SIGNIFICANT CHANGE UP (ref 0–6)
EPI CELLS # UR: SIGNIFICANT CHANGE UP
GLUCOSE SERPL-MCNC: 85 MG/DL — SIGNIFICANT CHANGE UP (ref 70–99)
GLUCOSE UR QL: NEGATIVE MG/DL — SIGNIFICANT CHANGE UP
HCT VFR BLD CALC: 38.4 % — SIGNIFICANT CHANGE UP (ref 34.5–45)
HGB BLD-MCNC: 13.2 G/DL — SIGNIFICANT CHANGE UP (ref 11.5–15.5)
IMM GRANULOCYTES NFR BLD AUTO: 0.4 % — SIGNIFICANT CHANGE UP (ref 0–0.9)
KETONES UR-MCNC: ABNORMAL
LEUKOCYTE ESTERASE UR-ACNC: ABNORMAL
LYMPHOCYTES # BLD AUTO: 2 K/UL — SIGNIFICANT CHANGE UP (ref 1–3.3)
LYMPHOCYTES # BLD AUTO: 20.9 % — SIGNIFICANT CHANGE UP (ref 13–44)
MCHC RBC-ENTMCNC: 29.1 PG — SIGNIFICANT CHANGE UP (ref 27–34)
MCHC RBC-ENTMCNC: 34.4 GM/DL — SIGNIFICANT CHANGE UP (ref 32–36)
MCV RBC AUTO: 84.8 FL — SIGNIFICANT CHANGE UP (ref 80–100)
MONOCYTES # BLD AUTO: 0.75 K/UL — SIGNIFICANT CHANGE UP (ref 0–0.9)
MONOCYTES NFR BLD AUTO: 7.8 % — SIGNIFICANT CHANGE UP (ref 2–14)
NEUTROPHILS # BLD AUTO: 6.64 K/UL — SIGNIFICANT CHANGE UP (ref 1.8–7.4)
NEUTROPHILS NFR BLD AUTO: 69.5 % — SIGNIFICANT CHANGE UP (ref 43–77)
NITRITE UR-MCNC: NEGATIVE — SIGNIFICANT CHANGE UP
PH UR: 7 — SIGNIFICANT CHANGE UP (ref 5–8)
PLATELET # BLD AUTO: 378 K/UL — SIGNIFICANT CHANGE UP (ref 150–400)
POTASSIUM SERPL-MCNC: 4.1 MMOL/L — SIGNIFICANT CHANGE UP (ref 3.5–5.3)
POTASSIUM SERPL-SCNC: 4.1 MMOL/L — SIGNIFICANT CHANGE UP (ref 3.5–5.3)
PROT SERPL-MCNC: 6.6 G/DL — SIGNIFICANT CHANGE UP (ref 6.6–8.7)
PROT UR-MCNC: NEGATIVE — SIGNIFICANT CHANGE UP
RBC # BLD: 4.53 M/UL — SIGNIFICANT CHANGE UP (ref 3.8–5.2)
RBC # FLD: 14 % — SIGNIFICANT CHANGE UP (ref 10.3–14.5)
RBC CASTS # UR COMP ASSIST: SIGNIFICANT CHANGE UP /HPF (ref 0–4)
SODIUM SERPL-SCNC: 138 MMOL/L — SIGNIFICANT CHANGE UP (ref 135–145)
SP GR SPEC: 1.02 — SIGNIFICANT CHANGE UP (ref 1.01–1.02)
UROBILINOGEN FLD QL: NEGATIVE MG/DL — SIGNIFICANT CHANGE UP
WBC # BLD: 9.57 K/UL — SIGNIFICANT CHANGE UP (ref 3.8–10.5)
WBC # FLD AUTO: 9.57 K/UL — SIGNIFICANT CHANGE UP (ref 3.8–10.5)
WBC UR QL: SIGNIFICANT CHANGE UP /HPF (ref 0–5)

## 2023-10-12 PROCEDURE — 36415 COLL VENOUS BLD VENIPUNCTURE: CPT

## 2023-10-12 PROCEDURE — 99284 EMERGENCY DEPT VISIT MOD MDM: CPT | Mod: 25

## 2023-10-12 PROCEDURE — 87086 URINE CULTURE/COLONY COUNT: CPT

## 2023-10-12 PROCEDURE — 81001 URINALYSIS AUTO W/SCOPE: CPT

## 2023-10-12 PROCEDURE — 80053 COMPREHEN METABOLIC PANEL: CPT

## 2023-10-12 PROCEDURE — 85025 COMPLETE CBC W/AUTO DIFF WBC: CPT

## 2023-10-12 PROCEDURE — 99284 EMERGENCY DEPT VISIT MOD MDM: CPT | Mod: GC

## 2023-10-12 PROCEDURE — 96365 THER/PROPH/DIAG IV INF INIT: CPT

## 2023-10-12 RX ORDER — LIDOCAINE 4 G/100G
1 CREAM TOPICAL ONCE
Refills: 0 | Status: DISCONTINUED | OUTPATIENT
Start: 2023-10-12 | End: 2023-10-12

## 2023-10-12 RX ORDER — SODIUM CHLORIDE 9 MG/ML
1000 INJECTION INTRAMUSCULAR; INTRAVENOUS; SUBCUTANEOUS ONCE
Refills: 0 | Status: COMPLETED | OUTPATIENT
Start: 2023-10-12 | End: 2023-10-12

## 2023-10-12 RX ORDER — DIBUCAINE 1 %
1 OINTMENT (GRAM) RECTAL ONCE
Refills: 0 | Status: COMPLETED | OUTPATIENT
Start: 2023-10-12 | End: 2023-10-12

## 2023-10-12 RX ORDER — HYDROCORTISONE 1 %
1 OINTMENT (GRAM) TOPICAL ONCE
Refills: 0 | Status: COMPLETED | OUTPATIENT
Start: 2023-10-12 | End: 2023-10-12

## 2023-10-12 RX ORDER — ACETAMINOPHEN 500 MG
575 TABLET ORAL ONCE
Refills: 0 | Status: COMPLETED | OUTPATIENT
Start: 2023-10-12 | End: 2023-10-12

## 2023-10-12 RX ADMIN — Medication 1 APPLICATION(S): at 08:08

## 2023-10-12 RX ADMIN — Medication 1 APPLICATION(S): at 09:05

## 2023-10-12 RX ADMIN — Medication 230 MILLIGRAM(S): at 08:08

## 2023-10-12 RX ADMIN — Medication 575 MILLIGRAM(S): at 08:24

## 2023-10-12 RX ADMIN — SODIUM CHLORIDE 1000 MILLILITER(S): 9 INJECTION INTRAMUSCULAR; INTRAVENOUS; SUBCUTANEOUS at 08:07

## 2023-10-12 NOTE — ED ADULT TRIAGE NOTE - CHIEF COMPLAINT QUOTE
Pt BIBEMS w/ c/o rectal pain. States "I have colorectal issues and was supposed to see my surgeon today". States pain has worsened over past 2 weeks. Endorses 15lb weight loss in 1 month. Pain 10/10. Pt. tearful and anxious in triage.

## 2023-10-12 NOTE — ED ADULT NURSE NOTE - NSFALLUNIVINTERV_ED_ALL_ED
Bed/Stretcher in lowest position, wheels locked, appropriate side rails in place/Call bell, personal items and telephone in reach/Instruct patient to call for assistance before getting out of bed/chair/stretcher/Non-slip footwear applied when patient is off stretcher/[place] to call system/Physically safe environment - no spills, clutter or unnecessary equipment/Purposeful proactive rounding/Room/bathroom lighting operational, light cord in reach

## 2023-10-12 NOTE — ED PROVIDER NOTE - PHYSICAL EXAMINATION
Gen: thin appearing female, tearful, anxious appearing.   Head: normocephalic, atraumatic  EENT: EOMI, dry mucous membranes, no scleral icterus, no JVD  Lung: no increased work of breathing, clear to auscultation bilaterally, no wheezing, speaking in full sentences  CV: regular rate, regular rhythm, normal s1/s2, 2+ radial pulses bilaterally  Abd: soft, non-tender, non-distended  RECTAL: Chaperone: NIMISHA Stroud. (+) multiple external hemorrhoids, nonthrombosed, minimal ttp. (+) minimal internal hemorrhoids felt.   MSK: No edema, no visible deformities, full range of motion in all 4 extremities  Neuro: Awake, alert, no focal neurologic deficits  Psych: anxious affect, pressured speech Gen: thin appearing female, tearful, anxious appearing.   Head: normocephalic, atraumatic  EENT: EOMI, dry mucous membranes, no scleral icterus, no JVD  Lung: no increased work of breathing, clear to auscultation bilaterally, no wheezing, speaking in full sentences  CV: regular rate, regular rhythm, normal s1/s2, 2+ radial pulses bilaterally  Abd: soft, non-tender, non-distended  RECTAL: Chaperone: NIMISHA Stroud. (+) multiple external hemorrhoids, nonthrombosed, minimal ttp. (+) minimal internal hemorrhoids felt. No perianal areas of fluctuance or redness  MSK: No edema, no visible deformities, full range of motion in all 4 extremities  Neuro: Awake, alert, no focal neurologic deficits  Psych: anxious affect, pressured speech

## 2023-10-12 NOTE — ED ADULT NURSE REASSESSMENT NOTE - NS ED NURSE REASSESS COMMENT FT1
Pt laying in bed, A&O x4, c/o rectal pain. Pt medicated for pain. Pt ambulatory to the bathroom without assistance. Denies any other needs @ this time.

## 2023-10-12 NOTE — ED PROVIDER NOTE - PATIENT PORTAL LINK FT
You can access the FollowMyHealth Patient Portal offered by [facility] by registering at the following website: [email or web address]. By joining [facility]’s FollowMyHealth portal, you will also be able to view your health information using other applications (apps) compatible with our system.

## 2023-10-12 NOTE — ED PROVIDER NOTE - OBJECTIVE STATEMENT
77 year old female with PMHx hemorrhoids, diffuse large B cell lymphoma s/p RCHOP+RT (in remission x 3 years) HLD, IBS-C (stopped taking Linzess due to diarrhea) presenting with rectal pain x 2 weeks, severe today, so came to ED. Patient states has colorectal surgeon - Dr. Daniella Guevara of [facility] - has appointment today at 2PM for her hemorrhoids, but came here as pain was too unbearable, described as "sitting on knives". Patient has tried topical hydrocortisone and hydrocortisone suppositories without relief of her hemorrhoids. Reports decreased PO intake over last few weeks due to rectal pain. Has not had a good BM in a few days which she attributes to decreased PO. Denies any fevers, chills, vomiting. 77 year old female with PMHx hemorrhoids, diffuse large B cell lymphoma s/p RCHOP+RT (in remission x 3 years) HLD, IBS-C (stopped taking Linzess due to diarrhea) presenting with rectal pain x 2 weeks, severe today, so came to ED. Patient states has colorectal surgeon - Dr. Daniella Guevara of [facility] - has appointment today at 2PM for her hemorrhoids, but came here as pain was too unbearable, described as "sitting on knives". Patient has tried topical hydrocortisone and hydrocortisone suppositories without relief of her hemorrhoids. Reports decreased PO intake over last few weeks due to rectal pain. Has not had a good BM in a few days which she attributes to decreased PO. Also c/o mild urinary urgency. Denies any fevers, chills, vomiting.

## 2023-10-12 NOTE — ED PROVIDER NOTE - ATTENDING CONTRIBUTION TO CARE
Dr. Rainey : I have personally seen and examined this patient at the bedside. I have fully participated in the care of this patient. I have reviewed all pertinent clinical information, including history, physical exam, plan and the Resident's note and agree except as noted.     77 year old female with PMHx hemorrhoids, diffuse large B cell lymphoma s/p RCHOP+RT (in remission x 3 years) HLD, IBS-C (stopped taking Linzess due to diarrhea) presenting with rectal pain x few month worse for 2 weeks, severe today, so came to ED. Patient states has colorectal surgeon - Dr. Daniella Guevara of [facility] - has appointment today at 2PM for her hemorrhoids, but came here as pain was too unbearable, described as "sitting on knives". Patient has tried topical hydrocortisone and hydrocortisone suppositories without relief of her hemorrhoids. was seen at [facility] 1 wee ka for the same; her colorectal prescribed the suppositories at that time and she was supposed to follow up today.  Reports decreased PO intake over last few weeks due to rectal pain. Has not had a good BM in a few days which she attributes to decreased PO. Also c/o mild urinary urgency.   Denies f/c/n/v/cp/sob/palpitations/cough/abd.pain/d/c//hematuria. no sick contacts/recent travel.    PE:  head; atraumatic normocephalic  eyes: perrla  Heart: rrr s1s2  lungs: ctab  abd: soft, nt nd + bs no rebound/guarding no cva ttp rectal: + non thrombosed external hemmorhoids  le: no swelling no calf ttp  back: no midline cervical/thoracic/lumbar ttp      -->hemmoroidal pain will tx with pain meds check labs ua- if pain control will dc so pt can see her colorectal surgeon today - will have friend pick her up

## 2023-10-12 NOTE — ED PROVIDER NOTE - CLINICAL SUMMARY MEDICAL DECISION MAKING FREE TEXT BOX
77 year old female 77 year old female with hx external hemorrhoids, presenting for evaluation of rectal pain. On physical exam, patient with multiple external hemorrhoids that are non-thrombosed, no perianal areas of fluctuance/swelling. CBC and CMP checked as patient noting decreased PO - wnl. Patient given IV hydration. UA negative for infection. Discussed extensively that patient needs to follow up with her colorectal surgeon for definitely management of hemorrhoids, verbalizes understanding. SW consulted to assist with discharge.

## 2023-10-12 NOTE — ED PROVIDER NOTE - NS ED ROS FT
Gen: No fever, no change in activity level  Resp: No cough, no trouble breathing  Cardiovascular: No chest pain, no palpitation  Gastrointestinal: No nausea, no vomiting, no diarrhea  :  No change in urine output; no dysuria, no hematuria. (+) rectal pain  MS: No joint or muscle pain  Neuro: No headache; no abnormal movements  Remainder negative, except as per the HPI Gen: No fever, no change in activity level  Resp: No cough, no trouble breathing  Cardiovascular: No chest pain, no palpitation  Gastrointestinal: No nausea, no vomiting, no diarrhea  :  (+) urinary urgency; no dysuria, no hematuria. (+) rectal pain  MS: No joint or muscle pain  Neuro: No headache; no abnormal movements  Remainder negative, except as per the HPI

## 2023-10-12 NOTE — ED ADULT NURSE NOTE - OBJECTIVE STATEMENT
Pt is A&Ox4. Respirations are even and unlabored. Color is appropriate for race. Skin warm and dry. Pt reports rectal pain. 10/10 pain. "it feels like sharp pencils are stabbing me in the butt". Hx of internal hemorrhoids. Pt is tearful. ED MD evaluating, plan of care ongoing.

## 2023-10-13 LAB
CULTURE RESULTS: SIGNIFICANT CHANGE UP
SPECIMEN SOURCE: SIGNIFICANT CHANGE UP

## 2023-11-13 ENCOUNTER — RESULT REVIEW (OUTPATIENT)
Age: 78
End: 2023-11-13

## 2023-11-13 ENCOUNTER — APPOINTMENT (OUTPATIENT)
Dept: HEMATOLOGY ONCOLOGY | Facility: CLINIC | Age: 78
End: 2023-11-13

## 2023-11-13 ENCOUNTER — APPOINTMENT (OUTPATIENT)
Dept: HEMATOLOGY ONCOLOGY | Facility: CLINIC | Age: 78
End: 2023-11-13
Payer: MEDICARE

## 2023-11-13 VITALS
RESPIRATION RATE: 16 BRPM | OXYGEN SATURATION: 97 % | TEMPERATURE: 97.4 F | SYSTOLIC BLOOD PRESSURE: 136 MMHG | WEIGHT: 88.18 LBS | BODY MASS INDEX: 17.09 KG/M2 | HEART RATE: 66 BPM | HEIGHT: 60.43 IN | DIASTOLIC BLOOD PRESSURE: 78 MMHG

## 2023-11-13 LAB
BASOPHILS # BLD AUTO: 0.07 K/UL — SIGNIFICANT CHANGE UP (ref 0–0.2)
BASOPHILS # BLD AUTO: 0.07 K/UL — SIGNIFICANT CHANGE UP (ref 0–0.2)
BASOPHILS NFR BLD AUTO: 0.6 % — SIGNIFICANT CHANGE UP (ref 0–2)
BASOPHILS NFR BLD AUTO: 0.6 % — SIGNIFICANT CHANGE UP (ref 0–2)
EOSINOPHIL # BLD AUTO: 0.14 K/UL — SIGNIFICANT CHANGE UP (ref 0–0.5)
EOSINOPHIL # BLD AUTO: 0.14 K/UL — SIGNIFICANT CHANGE UP (ref 0–0.5)
EOSINOPHIL NFR BLD AUTO: 1.1 % — SIGNIFICANT CHANGE UP (ref 0–6)
EOSINOPHIL NFR BLD AUTO: 1.1 % — SIGNIFICANT CHANGE UP (ref 0–6)
HCT VFR BLD CALC: 38.4 % — SIGNIFICANT CHANGE UP (ref 34.5–45)
HCT VFR BLD CALC: 38.4 % — SIGNIFICANT CHANGE UP (ref 34.5–45)
HGB BLD-MCNC: 12.8 G/DL — SIGNIFICANT CHANGE UP (ref 11.5–15.5)
HGB BLD-MCNC: 12.8 G/DL — SIGNIFICANT CHANGE UP (ref 11.5–15.5)
IMM GRANULOCYTES NFR BLD AUTO: 0.4 % — SIGNIFICANT CHANGE UP (ref 0–0.9)
IMM GRANULOCYTES NFR BLD AUTO: 0.4 % — SIGNIFICANT CHANGE UP (ref 0–0.9)
LYMPHOCYTES # BLD AUTO: 2.86 K/UL — SIGNIFICANT CHANGE UP (ref 1–3.3)
LYMPHOCYTES # BLD AUTO: 2.86 K/UL — SIGNIFICANT CHANGE UP (ref 1–3.3)
LYMPHOCYTES # BLD AUTO: 23 % — SIGNIFICANT CHANGE UP (ref 13–44)
LYMPHOCYTES # BLD AUTO: 23 % — SIGNIFICANT CHANGE UP (ref 13–44)
MCHC RBC-ENTMCNC: 29.3 PG — SIGNIFICANT CHANGE UP (ref 27–34)
MCHC RBC-ENTMCNC: 29.3 PG — SIGNIFICANT CHANGE UP (ref 27–34)
MCHC RBC-ENTMCNC: 33.3 G/DL — SIGNIFICANT CHANGE UP (ref 32–36)
MCHC RBC-ENTMCNC: 33.3 G/DL — SIGNIFICANT CHANGE UP (ref 32–36)
MCV RBC AUTO: 87.9 FL — SIGNIFICANT CHANGE UP (ref 80–100)
MCV RBC AUTO: 87.9 FL — SIGNIFICANT CHANGE UP (ref 80–100)
MONOCYTES # BLD AUTO: 0.99 K/UL — HIGH (ref 0–0.9)
MONOCYTES # BLD AUTO: 0.99 K/UL — HIGH (ref 0–0.9)
MONOCYTES NFR BLD AUTO: 8 % — SIGNIFICANT CHANGE UP (ref 2–14)
MONOCYTES NFR BLD AUTO: 8 % — SIGNIFICANT CHANGE UP (ref 2–14)
NEUTROPHILS # BLD AUTO: 8.32 K/UL — HIGH (ref 1.8–7.4)
NEUTROPHILS # BLD AUTO: 8.32 K/UL — HIGH (ref 1.8–7.4)
NEUTROPHILS NFR BLD AUTO: 66.9 % — SIGNIFICANT CHANGE UP (ref 43–77)
NEUTROPHILS NFR BLD AUTO: 66.9 % — SIGNIFICANT CHANGE UP (ref 43–77)
NRBC # BLD: 0 /100 WBCS — SIGNIFICANT CHANGE UP (ref 0–0)
NRBC # BLD: 0 /100 WBCS — SIGNIFICANT CHANGE UP (ref 0–0)
PLATELET # BLD AUTO: 346 K/UL — SIGNIFICANT CHANGE UP (ref 150–400)
PLATELET # BLD AUTO: 346 K/UL — SIGNIFICANT CHANGE UP (ref 150–400)
RBC # BLD: 4.37 M/UL — SIGNIFICANT CHANGE UP (ref 3.8–5.2)
RBC # BLD: 4.37 M/UL — SIGNIFICANT CHANGE UP (ref 3.8–5.2)
RBC # FLD: 15 % — HIGH (ref 10.3–14.5)
RBC # FLD: 15 % — HIGH (ref 10.3–14.5)
WBC # BLD: 12.43 K/UL — HIGH (ref 3.8–10.5)
WBC # BLD: 12.43 K/UL — HIGH (ref 3.8–10.5)
WBC # FLD AUTO: 12.43 K/UL — HIGH (ref 3.8–10.5)
WBC # FLD AUTO: 12.43 K/UL — HIGH (ref 3.8–10.5)

## 2023-11-13 PROCEDURE — 99215 OFFICE O/P EST HI 40 MIN: CPT

## 2023-11-13 RX ORDER — LINACLOTIDE 145 UG/1
145 CAPSULE, GELATIN COATED ORAL
Refills: 0 | Status: DISCONTINUED | COMMUNITY
End: 2023-11-13

## 2023-11-13 RX ORDER — LUBIPROSTONE 24 UG/1
24 CAPSULE, GELATIN COATED ORAL TWICE DAILY
Qty: 60 | Refills: 2 | Status: DISCONTINUED | COMMUNITY
Start: 2020-10-13 | End: 2023-11-13

## 2023-11-13 RX ORDER — LUBIPROSTONE 24 UG/1
24 CAPSULE, GELATIN COATED ORAL
Refills: 0 | Status: DISCONTINUED | COMMUNITY
End: 2023-11-13

## 2023-11-16 LAB
ALBUMIN SERPL ELPH-MCNC: 4.3 G/DL
ALP BLD-CCNC: 57 U/L
ALT SERPL-CCNC: 9 U/L
ANION GAP SERPL CALC-SCNC: 10 MMOL/L
AST SERPL-CCNC: 13 U/L
BILIRUB SERPL-MCNC: 0.2 MG/DL
BUN SERPL-MCNC: 10 MG/DL
CALCIUM SERPL-MCNC: 9.9 MG/DL
CHLORIDE SERPL-SCNC: 102 MMOL/L
CO2 SERPL-SCNC: 27 MMOL/L
CREAT SERPL-MCNC: 0.73 MG/DL
EGFR: 86 ML/MIN/1.73M2
GLUCOSE SERPL-MCNC: 82 MG/DL
LDH SERPL-CCNC: 165 U/L
POTASSIUM SERPL-SCNC: 4.4 MMOL/L
PROT SERPL-MCNC: 6.7 G/DL
SODIUM SERPL-SCNC: 140 MMOL/L

## 2024-02-04 ENCOUNTER — NON-APPOINTMENT (OUTPATIENT)
Age: 79
End: 2024-02-04

## 2024-02-06 ENCOUNTER — APPOINTMENT (OUTPATIENT)
Dept: INTERNAL MEDICINE | Facility: CLINIC | Age: 79
End: 2024-02-06
Payer: MEDICARE

## 2024-02-06 VITALS
HEIGHT: 61.42 IN | TEMPERATURE: 98.5 F | OXYGEN SATURATION: 98 % | BODY MASS INDEX: 17.8 KG/M2 | WEIGHT: 95.5 LBS | HEART RATE: 80 BPM | DIASTOLIC BLOOD PRESSURE: 74 MMHG | SYSTOLIC BLOOD PRESSURE: 142 MMHG

## 2024-02-06 VITALS — DIASTOLIC BLOOD PRESSURE: 72 MMHG | SYSTOLIC BLOOD PRESSURE: 112 MMHG

## 2024-02-06 DIAGNOSIS — K58.1 IRRITABLE BOWEL SYNDROME WITH CONSTIPATION: ICD-10-CM

## 2024-02-06 DIAGNOSIS — Z51.5 ENCOUNTER FOR PALLIATIVE CARE: ICD-10-CM

## 2024-02-06 PROCEDURE — 99406 BEHAV CHNG SMOKING 3-10 MIN: CPT

## 2024-02-06 PROCEDURE — 99204 OFFICE O/P NEW MOD 45 MIN: CPT

## 2024-02-06 RX ORDER — MIRABEGRON 25 MG/1
25 TABLET, FILM COATED, EXTENDED RELEASE ORAL
Qty: 90 | Refills: 0 | Status: DISCONTINUED | COMMUNITY
Start: 2022-05-27 | End: 2024-02-06

## 2024-02-06 RX ORDER — HYDROCORTISONE 25 MG/G
2.5 CREAM TOPICAL
Qty: 30 | Refills: 0 | Status: DISCONTINUED | COMMUNITY
Start: 2021-12-29 | End: 2024-02-06

## 2024-02-06 RX ORDER — OXYCODONE 5 MG/1
5 TABLET ORAL
Qty: 45 | Refills: 0 | Status: DISCONTINUED | COMMUNITY
Start: 2023-06-12 | End: 2024-02-06

## 2024-02-06 RX ORDER — DOCUSATE SODIUM 100 MG
100 TABLET ORAL
Refills: 0 | Status: ACTIVE | COMMUNITY

## 2024-02-06 RX ORDER — MUPIROCIN 20 MG/G
2 OINTMENT TOPICAL
Qty: 22 | Refills: 0 | Status: DISCONTINUED | COMMUNITY
Start: 2022-09-10 | End: 2024-02-06

## 2024-02-06 RX ORDER — MIRABEGRON 50 MG/1
50 TABLET, FILM COATED, EXTENDED RELEASE ORAL
Qty: 90 | Refills: 0 | Status: DISCONTINUED | COMMUNITY
Start: 2022-10-18 | End: 2024-02-06

## 2024-02-07 NOTE — COUNSELING
[Yes] : Risk of tobacco use and health benefits of smoking cessation discussed: Yes [Encouraged to pick a quit date and identify support needed to quit] : Encouraged to pick a quit date and identify support needed to quit [Behavioral health counseling provided] : Behavioral health counseling provided [FreeTextEntry1] : 3

## 2024-02-07 NOTE — HISTORY OF PRESENT ILLNESS
[FreeTextEntry1] : New patient, establish care [de-identified] : Ms. JACQUE BROWN is a 78 year old female hx of IBS w/ constipation/chronic abdominal pain, anxiety and depression, diffuse large B cell lymphoma s/p CHOP and radiation therapy presenting to establish care Previously seeing Dr. Calderón, now who soley does GI -Notes hx of depression/anxiety-on Celexa and xanax TID for anxiety Previously seeing a psychiatrist, but then PCP started refilling medications She prefers not to see psychiatry as been on these current medications for more than 20 years She takes xanax almost standing daily as opposed to PRN Denies excessive sedation or falls/confusion -Deals with daily abdominal pain-following w /GI

## 2024-02-07 NOTE — PLAN
[FreeTextEntry1] : Reviewed medications with patient Risks of benzodiazepines in aging adults reviewed with patient and discussed use of PRN vs standing with patient She is open to tapering dose and use PRN   Istop reviewed, due for refill on 2/14 She is aware to follow up in office every 3 mos for medication refill of xanax, next refill due in May

## 2024-02-07 NOTE — HEALTH RISK ASSESSMENT
[Current] : Current [0] : 1) Little interest or pleasure doing things: Not at all (0) [1] : 2) Feeling down, depressed, or hopeless for several days (1) [PHQ-2 Negative - No further assessment needed] : PHQ-2 Negative - No further assessment needed [Single] : single [Fully functional (bathing, dressing, toileting, transferring, walking, feeding)] : Fully functional (bathing, dressing, toileting, transferring, walking, feeding) [Fully functional (using the telephone, shopping, preparing meals, housekeeping, doing laundry, using] : Fully functional and needs no help or supervision to perform IADLs (using the telephone, shopping, preparing meals, housekeeping, doing laundry, using transportation, managing medications and managing finances) [[id number]] : 1

## 2024-02-23 ENCOUNTER — APPOINTMENT (OUTPATIENT)
Dept: GASTROENTEROLOGY | Facility: CLINIC | Age: 79
End: 2024-02-23
Payer: MEDICARE

## 2024-02-23 VITALS
OXYGEN SATURATION: 98 % | RESPIRATION RATE: 16 BRPM | WEIGHT: 92 LBS | DIASTOLIC BLOOD PRESSURE: 83 MMHG | HEART RATE: 80 BPM | SYSTOLIC BLOOD PRESSURE: 140 MMHG | HEIGHT: 61.42 IN | BODY MASS INDEX: 17.15 KG/M2 | TEMPERATURE: 98.2 F

## 2024-02-23 PROCEDURE — 99214 OFFICE O/P EST MOD 30 MIN: CPT

## 2024-02-23 NOTE — HISTORY OF PRESENT ILLNESS
[FreeTextEntry1] : Patient presents for followup of abdominal pain and spastic colon Will continue antispasmodics, Will schedule colonoscopy and upper endoscopy at [facility]

## 2024-02-26 ENCOUNTER — TRANSCRIPTION ENCOUNTER (OUTPATIENT)
Age: 79
End: 2024-02-26

## 2024-02-26 DIAGNOSIS — B37.9 CANDIDIASIS, UNSPECIFIED: ICD-10-CM

## 2024-03-20 ENCOUNTER — RESULT REVIEW (OUTPATIENT)
Age: 79
End: 2024-03-20

## 2024-03-20 ENCOUNTER — APPOINTMENT (OUTPATIENT)
Age: 79
End: 2024-03-20
Payer: MEDICARE

## 2024-03-20 PROCEDURE — 45380 COLONOSCOPY AND BIOPSY: CPT

## 2024-03-27 DIAGNOSIS — Z87.19 PERSONAL HISTORY OF OTHER DISEASES OF THE DIGESTIVE SYSTEM: ICD-10-CM

## 2024-03-27 RX ORDER — BUDESONIDE 3 MG/1
3 CAPSULE, COATED PELLETS ORAL
Qty: 60 | Refills: 0 | Status: ACTIVE | COMMUNITY
Start: 2024-03-27 | End: 1900-01-01

## 2024-04-04 ENCOUNTER — APPOINTMENT (OUTPATIENT)
Dept: GASTROENTEROLOGY | Facility: CLINIC | Age: 79
End: 2024-04-04
Payer: MEDICARE

## 2024-04-04 VITALS
HEART RATE: 80 BPM | WEIGHT: 88 LBS | DIASTOLIC BLOOD PRESSURE: 64 MMHG | BODY MASS INDEX: 16.62 KG/M2 | HEIGHT: 61 IN | OXYGEN SATURATION: 97 % | SYSTOLIC BLOOD PRESSURE: 120 MMHG

## 2024-04-04 PROCEDURE — 99214 OFFICE O/P EST MOD 30 MIN: CPT

## 2024-04-04 NOTE — ASSESSMENT
[FreeTextEntry1] : Patient with spastic colitis flare  peppermint oil capsules   increase budesonide to 3 capsules per day

## 2024-05-05 ENCOUNTER — NON-APPOINTMENT (OUTPATIENT)
Age: 79
End: 2024-05-05

## 2024-05-06 ENCOUNTER — OUTPATIENT (OUTPATIENT)
Dept: OUTPATIENT SERVICES | Facility: HOSPITAL | Age: 79
LOS: 1 days | Discharge: HOME | End: 2024-05-06

## 2024-05-06 DIAGNOSIS — C85.90 NON-HODGKIN LYMPHOMA, UNSPECIFIED, UNSPECIFIED SITE: ICD-10-CM

## 2024-05-07 ENCOUNTER — APPOINTMENT (OUTPATIENT)
Dept: INTERNAL MEDICINE | Facility: CLINIC | Age: 79
End: 2024-05-07

## 2024-05-10 ENCOUNTER — APPOINTMENT (OUTPATIENT)
Dept: HEMATOLOGY ONCOLOGY | Facility: CLINIC | Age: 79
End: 2024-05-10
Payer: MEDICARE

## 2024-05-10 ENCOUNTER — RESULT REVIEW (OUTPATIENT)
Age: 79
End: 2024-05-10

## 2024-05-10 VITALS
WEIGHT: 95.9 LBS | OXYGEN SATURATION: 98 % | SYSTOLIC BLOOD PRESSURE: 145 MMHG | TEMPERATURE: 98 F | DIASTOLIC BLOOD PRESSURE: 85 MMHG | RESPIRATION RATE: 16 BRPM | HEIGHT: 61 IN | BODY MASS INDEX: 18.11 KG/M2 | HEART RATE: 70 BPM

## 2024-05-10 DIAGNOSIS — K52.9 NONINFECTIVE GASTROENTERITIS AND COLITIS, UNSPECIFIED: ICD-10-CM

## 2024-05-10 LAB
BASOPHILS # BLD AUTO: 0.1 K/UL — SIGNIFICANT CHANGE UP (ref 0–0.2)
BASOPHILS NFR BLD AUTO: 0.7 % — SIGNIFICANT CHANGE UP (ref 0–2)
EOSINOPHIL # BLD AUTO: 0.25 K/UL — SIGNIFICANT CHANGE UP (ref 0–0.5)
EOSINOPHIL NFR BLD AUTO: 1.8 % — SIGNIFICANT CHANGE UP (ref 0–6)
HCT VFR BLD CALC: 40.7 % — SIGNIFICANT CHANGE UP (ref 34.5–45)
HGB BLD-MCNC: 13.4 G/DL — SIGNIFICANT CHANGE UP (ref 11.5–15.5)
IMM GRANULOCYTES NFR BLD AUTO: 0.6 % — SIGNIFICANT CHANGE UP (ref 0–0.9)
LYMPHOCYTES # BLD AUTO: 16 % — SIGNIFICANT CHANGE UP (ref 13–44)
LYMPHOCYTES # BLD AUTO: 2.26 K/UL — SIGNIFICANT CHANGE UP (ref 1–3.3)
MCHC RBC-ENTMCNC: 29.3 PG — SIGNIFICANT CHANGE UP (ref 27–34)
MCHC RBC-ENTMCNC: 32.9 G/DL — SIGNIFICANT CHANGE UP (ref 32–36)
MCV RBC AUTO: 88.9 FL — SIGNIFICANT CHANGE UP (ref 80–100)
MONOCYTES # BLD AUTO: 1 K/UL — HIGH (ref 0–0.9)
MONOCYTES NFR BLD AUTO: 7.1 % — SIGNIFICANT CHANGE UP (ref 2–14)
NEUTROPHILS # BLD AUTO: 10.41 K/UL — HIGH (ref 1.8–7.4)
NEUTROPHILS NFR BLD AUTO: 73.8 % — SIGNIFICANT CHANGE UP (ref 43–77)
NRBC # BLD: 0 /100 WBCS — SIGNIFICANT CHANGE UP (ref 0–0)
NRBC BLD-RTO: 0 /100 WBCS — SIGNIFICANT CHANGE UP (ref 0–0)
PLATELET # BLD AUTO: 361 K/UL — SIGNIFICANT CHANGE UP (ref 150–400)
RBC # BLD: 4.58 M/UL — SIGNIFICANT CHANGE UP (ref 3.8–5.2)
RBC # FLD: 14.6 % — HIGH (ref 10.3–14.5)
WBC # BLD: 14.1 K/UL — HIGH (ref 3.8–10.5)
WBC # FLD AUTO: 14.1 K/UL — HIGH (ref 3.8–10.5)

## 2024-05-10 PROCEDURE — 99215 OFFICE O/P EST HI 40 MIN: CPT

## 2024-05-10 NOTE — HISTORY OF PRESENT ILLNESS
[0 - No Distress] : Distress Level: 0 [80: Normal activity with effort; some signs or symptoms of disease.] : 80: Normal activity with effort; some signs or symptoms of disease.  [de-identified] : 76 yo woman with PMHx of HLP, IBS,  referred for evaluation of diffuse large B cell lymphoma.   Pt complaint of  left  anterior forehead mass for several years since hitting forehead with door and states has continued to increase in size and has discomfort. denies drainage or infection. Patient developed  during the summer time increase in pressure headaches everyday and also noticed blurry vision. Denies N/V.   CT scan of the head 11/6/19: c/w there is mild soft tissue prominence deep to the marker placed over the left inferior frontal scalp without a discrete mass or fluid collection. 10/11/2019- MRI of the brain: Left frontal calvarial / sphenoid bone lesion with extracalvarial and dural involvement suspected.    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 Ki-67 50-60%. Diffuse large B cell lymphoma can not be rule out. FISH for MYC, BCL2 and BCL6 are pending.  Immunohistochemical stains CD20, PAX5, CD10, BCL6, BCL2 ( weak), CD23,  c-MYC (20%) CD 30 few cells. Negative CD5, cyclin D1, P53.  CD3 stain highlights small T cells in the background. CD21 stain highlight few residual follicular dendritic cell meshwork.  EBV encoded RNA negative.   Bone marrow biopsy 11/21/19 negative.   PET/CT 11/26/19   1. FDG avid left frontal scalp soft tissue thickening, consistent with biopsy-proven lymphoma.  Underlying marrow signal abnormality seen on MRI is not definitively appreciated on PET/CT.  2. Minimally FDG avid 0.5 cm probable lymph node left parotid tail.  3. No FDG avid disease below the diaphragm.  12/19/19- S/P first cycle of R-CHOP complicated by mucositis, thrush and generalized weakness.  1/9/2020- Cycle # 2 Mini-CHOP, tolerated well. S/P cycle # 3 of Mini-CHOP on 1/30/2020.    Third cycle was complicated by grade three mucositis, decrease appetite, not able to drink fluids, similar to S/E with R-CHOP first cycle.  Patient completed radiation therapy 20 treatments on 4/23/2020.   Patient has internal and external hemorrhoids (painful).  S/P banding of hemorrhoids done.  Patient was admitted in October, 2023 for two weeks with abdominal pain and diarrhea. Patient was treated with steroids, CBC 12.43.  MRI of the abdomen with MRCP non-contrast was negative on 10/31/23 [de-identified] : Patient reports still feeling intermittent abdominal pain on Budesonide/ glycopyrrolate Denies fevers, night sweats.  No other changes in her medical, surgical or social history since 11/13/23.

## 2024-05-10 NOTE — PHYSICAL EXAM
[Ambulatory and capable of all self care but unable to carry out any work activities] : Status 2- Ambulatory and capable of all self care but unable to carry out any work activities. Up and about more than 50% of waking hours [Thin] : thin [Normal] : affect appropriate [de-identified] : distended, BS increased in 4 quadrants

## 2024-05-10 NOTE — ASSESSMENT
[FreeTextEntry1] : 77 yo woman with PMHx of HLP, IBS, diagnosed with  diffuse large B cell lymphoma localized to the scalp.   CT scan of the head 11/6/19: c/w there is mild soft tissue prominence deep to the marker placed over the left inferior frontal scalp without a discrete mass or fluid collection. 10/11/2019- MRI of the brain: Left frontal calvarial / sphenoid bone lesion with extracalvarial and dural involvement suspected.   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 Ki-67 50-60%. Diffuse large B cell lymphoma can not be rule out. FISH for MYC, BCL2 and BCL6 are pending.  Immunohistochemical stains CD20, PAX5, CD10, BCL6, BCL2 ( weak), CD23, c-MYC (20%) CD 30 few cells. Negative CD5, cyclin D1, P53.  CD3 stain highlights small T cells in the background. CD21 stain highlight few residual follicular dendritic cell meshwork.  EBV encoded RNA negative.   8/5/21-PET/CT: No evidence of FDG avid disease/recurrent lymphoma.   3/18/22-CT scan of the abdomen and pelvis- was negative.  Bone marrow biopsy 11/21/19 negative.   12/19/19- S/P first cycle of R-CHOP complicated by grade 2-3  mucositis, thrush and generalized weakness.  1/9/2020- Cycle # 2 Mini-CHOP, tolerated well. S/P cycle # 3 of Mini-CHOP on 1/30/2020.    Third cycle was complicated by grade three mucositis, decrease appetite, not able to drink fluids, similar to S/E with R-CHOP first cycle. Chemotherapy was discontinued at this time.  Patient completed radiation therapy 20 treatments on 4/23/2020 with Dr Spencer.   GI- Patient complaints of having chronic abdominal pain (colitis): On Budesonide 9 mg daily.  Anxiety/depression: Recommended a therapist patient has issues to resolve with her son.   Greater than 50% of the encounter time was spent on counseling and coordination of care for  DLBCL    and I have spent   40  minutes of face to face time with the patient.  RTC 6 months.

## 2024-05-13 ENCOUNTER — APPOINTMENT (OUTPATIENT)
Dept: GASTROENTEROLOGY | Facility: CLINIC | Age: 79
End: 2024-05-13
Payer: MEDICARE

## 2024-05-13 VITALS
DIASTOLIC BLOOD PRESSURE: 65 MMHG | BODY MASS INDEX: 18.5 KG/M2 | OXYGEN SATURATION: 98 % | SYSTOLIC BLOOD PRESSURE: 122 MMHG | HEART RATE: 67 BPM | HEIGHT: 61 IN | WEIGHT: 98 LBS

## 2024-05-13 DIAGNOSIS — K52.9 NONINFECTIVE GASTROENTERITIS AND COLITIS, UNSPECIFIED: ICD-10-CM

## 2024-05-13 LAB
ALBUMIN SERPL ELPH-MCNC: 4.5 G/DL
ALP BLD-CCNC: 53 U/L
ALT SERPL-CCNC: 15 U/L
ANION GAP SERPL CALC-SCNC: 13 MMOL/L
AST SERPL-CCNC: 19 U/L
BILIRUB SERPL-MCNC: 0.2 MG/DL
BUN SERPL-MCNC: 13 MG/DL
CALCIUM SERPL-MCNC: 9.7 MG/DL
CHLORIDE SERPL-SCNC: 102 MMOL/L
CO2 SERPL-SCNC: 25 MMOL/L
CREAT SERPL-MCNC: 0.73 MG/DL
EGFR: 85 ML/MIN/1.73M2
GLUCOSE SERPL-MCNC: 107 MG/DL
LDH SERPL-CCNC: 176 U/L
POTASSIUM SERPL-SCNC: 4.8 MMOL/L
PROT SERPL-MCNC: 7 G/DL
SODIUM SERPL-SCNC: 140 MMOL/L

## 2024-05-13 PROCEDURE — 99214 OFFICE O/P EST MOD 30 MIN: CPT

## 2024-05-13 RX ORDER — OMEPRAZOLE 20 MG/1
20 CAPSULE, DELAYED RELEASE ORAL
Qty: 30 | Refills: 1 | Status: ACTIVE | COMMUNITY
Start: 2024-05-13 | End: 1900-01-01

## 2024-05-13 NOTE — PHYSICAL EXAM

## 2024-05-13 NOTE — HISTORY OF PRESENT ILLNESS
[FreeTextEntry1] : Chief complaint: abdominal pain   HPI: Patient presents for evaluation and management of abdominal pain patient with underlying lymphoma

## 2024-05-13 NOTE — ASSESSMENT
[FreeTextEntry1] : Patient with flare of gerd and flare of spastic colitis  low residue diet  flare of gerd new prescription sent for omeprazole to [facility] moderate degree of medical complexity in this visit

## 2024-05-15 ENCOUNTER — TRANSCRIPTION ENCOUNTER (OUTPATIENT)
Age: 79
End: 2024-05-15

## 2024-05-20 ENCOUNTER — TRANSCRIPTION ENCOUNTER (OUTPATIENT)
Age: 79
End: 2024-05-20

## 2024-05-23 RX ORDER — CITALOPRAM HYDROBROMIDE 40 MG/1
40 TABLET, FILM COATED ORAL
Qty: 30 | Refills: 0 | Status: ACTIVE | COMMUNITY

## 2024-05-24 RX ORDER — ALPRAZOLAM 0.5 MG/1
0.5 TABLET ORAL
Qty: 60 | Refills: 0 | Status: ACTIVE | COMMUNITY
Start: 2024-05-24 | End: 1900-01-01

## 2024-06-03 ENCOUNTER — APPOINTMENT (OUTPATIENT)
Dept: GASTROENTEROLOGY | Facility: CLINIC | Age: 79
End: 2024-06-03
Payer: MEDICARE

## 2024-06-03 VITALS
DIASTOLIC BLOOD PRESSURE: 77 MMHG | SYSTOLIC BLOOD PRESSURE: 137 MMHG | BODY MASS INDEX: 18.31 KG/M2 | OXYGEN SATURATION: 98 % | HEART RATE: 68 BPM | HEIGHT: 61 IN | WEIGHT: 97 LBS

## 2024-06-03 DIAGNOSIS — C83.39 DIFFUSE LARGE B-CELL LYMPHOMA, EXTRANODAL AND SOLID ORGAN SITES: ICD-10-CM

## 2024-06-03 DIAGNOSIS — R10.30 LOWER ABDOMINAL PAIN, UNSPECIFIED: ICD-10-CM

## 2024-06-03 PROCEDURE — 99213 OFFICE O/P EST LOW 20 MIN: CPT

## 2024-06-03 NOTE — PHYSICAL EXAM
[Alert] : alert [Normal Voice/Communication] : normal voice/communication [Healthy Appearing] : healthy appearing [No Acute Distress] : no acute distress [Sclera] : the sclera and conjunctiva were normal [Hearing Threshold Finger Rub Not [place]] : hearing was normal [Normal Lips/Gums] : the lips and gums were normal [Oropharynx] : the oropharynx was normal [Normal Appearance] : the appearance of the neck was normal [No Neck Mass] : no neck mass was observed [No Respiratory Distress] : no respiratory distress [No Acc Muscle Use] : no accessory muscle use [Respiration, Rhythm And Depth] : normal respiratory rhythm and effort [Auscultation Breath Sounds / Voice Sounds] : lungs were clear to auscultation bilaterally [Heart Rate And Rhythm] : heart rate was normal and rhythm regular [Normal S1, S2] : normal S1 and S2 [Murmurs] : no murmurs [Bowel Sounds] : normal bowel sounds [Abdomen Tenderness] : non-tender [No Masses] : no abdominal mass palpated [Abdomen Soft] : soft [] : no hepatosplenomegaly [Oriented To Time, Place, And Person] : oriented to person, place, and time

## 2024-06-03 NOTE — ASSESSMENT
[FreeTextEntry1] : Patient with spastic colon, may need bladder suspension  weight has been stable  medication reviewed and reconciled case discussed with Urogynecologist

## 2024-06-03 NOTE — HISTORY OF PRESENT ILLNESS
[FreeTextEntry1] : Chief complaint: abdominal pain   HPI: Patient presents for evaluation and management of abdominal pain. Intermittent, crampy llq abdominal pain. There are no exacerbating or ameliorating factors. The abdominal pain is nonradiating. Associated factors include nausea  Patient consulting a Urogynecologist, and is considering having a bladder suspension

## 2024-06-04 ENCOUNTER — RX RENEWAL (OUTPATIENT)
Age: 79
End: 2024-06-04

## 2024-06-04 RX ORDER — TRAMADOL HYDROCHLORIDE 50 MG/1
50 TABLET, COATED ORAL
Qty: 120 | Refills: 3 | Status: ACTIVE | COMMUNITY
Start: 2022-07-29 | End: 1900-01-01

## 2024-06-12 ENCOUNTER — APPOINTMENT (OUTPATIENT)
Dept: FAMILY MEDICINE | Facility: CLINIC | Age: 79
End: 2024-06-12
Payer: MEDICARE

## 2024-06-12 VITALS
WEIGHT: 96 LBS | DIASTOLIC BLOOD PRESSURE: 70 MMHG | SYSTOLIC BLOOD PRESSURE: 120 MMHG | HEIGHT: 61 IN | BODY MASS INDEX: 18.12 KG/M2

## 2024-06-12 DIAGNOSIS — D72.829 ELEVATED WHITE BLOOD CELL COUNT, UNSPECIFIED: ICD-10-CM

## 2024-06-12 DIAGNOSIS — F17.210 NICOTINE DEPENDENCE, CIGARETTES, UNCOMPLICATED: ICD-10-CM

## 2024-06-12 DIAGNOSIS — Z00.00 ENCOUNTER FOR GENERAL ADULT MEDICAL EXAMINATION W/OUT ABNORMAL FINDINGS: ICD-10-CM

## 2024-06-12 DIAGNOSIS — E78.5 HYPERLIPIDEMIA, UNSPECIFIED: ICD-10-CM

## 2024-06-12 DIAGNOSIS — F32.A ANXIETY DISORDER, UNSPECIFIED: ICD-10-CM

## 2024-06-12 DIAGNOSIS — F41.9 ANXIETY DISORDER, UNSPECIFIED: ICD-10-CM

## 2024-06-12 PROCEDURE — 99205 OFFICE O/P NEW HI 60 MIN: CPT

## 2024-06-12 PROCEDURE — G2211 COMPLEX E/M VISIT ADD ON: CPT

## 2024-06-12 RX ORDER — ALPRAZOLAM 0.5 MG/1
0.5 TABLET ORAL
Qty: 60 | Refills: 0 | Status: DISCONTINUED | COMMUNITY
Start: 2024-04-15 | End: 2024-06-12

## 2024-06-12 RX ORDER — FLUTICASONE FUROATE AND VILANTEROL TRIFENATATE 50; 25 UG/1; UG/1
POWDER RESPIRATORY (INHALATION)
Refills: 0 | Status: DISCONTINUED | COMMUNITY
End: 2024-06-12

## 2024-06-12 RX ORDER — LINACLOTIDE 72 UG/1
72 CAPSULE, GELATIN COATED ORAL
Refills: 0 | Status: DISCONTINUED | COMMUNITY
Start: 2022-10-14 | End: 2024-06-12

## 2024-06-12 RX ORDER — LUBIPROSTONE 8 UG/1
8 CAPSULE ORAL
Qty: 60 | Refills: 3 | Status: DISCONTINUED | COMMUNITY
Start: 2024-02-26 | End: 2024-06-12

## 2024-06-12 RX ORDER — CEPHALEXIN 500 MG/1
500 CAPSULE ORAL 3 TIMES DAILY
Qty: 30 | Refills: 1 | Status: DISCONTINUED | COMMUNITY
Start: 2024-04-25 | End: 2024-06-12

## 2024-06-12 RX ORDER — FLUCONAZOLE 100 MG/1
100 TABLET ORAL DAILY
Qty: 14 | Refills: 1 | Status: DISCONTINUED | COMMUNITY
Start: 2024-02-26 | End: 2024-06-12

## 2024-06-12 RX ORDER — CITALOPRAM HYDROBROMIDE 40 MG/1
40 TABLET, FILM COATED ORAL
Qty: 30 | Refills: 1 | Status: DISCONTINUED | COMMUNITY
Start: 2024-05-24 | End: 2024-06-12

## 2024-06-12 RX ORDER — BUDESONIDE 3 MG/1
3 CAPSULE, COATED PELLETS ORAL
Qty: 90 | Refills: 3 | Status: DISCONTINUED | COMMUNITY
Start: 2024-04-12 | End: 2024-06-12

## 2024-06-12 RX ORDER — TRAMADOL HYDROCHLORIDE 50 MG/1
50 TABLET, COATED ORAL
Qty: 21 | Refills: 0 | Status: DISCONTINUED | COMMUNITY
Start: 2024-06-03 | End: 2024-06-12

## 2024-06-12 RX ORDER — GLYCOPYRROLATE 1 MG/1
1 TABLET ORAL
Qty: 30 | Refills: 1 | Status: DISCONTINUED | COMMUNITY
Start: 2024-04-15 | End: 2024-06-12

## 2024-06-12 RX ORDER — GLYCOPYRROLATE 1 MG/1
1 TABLET ORAL
Qty: 30 | Refills: 1 | Status: DISCONTINUED | COMMUNITY
Start: 2024-03-27 | End: 2024-06-12

## 2024-06-12 RX ORDER — LINACLOTIDE 145 UG/1
145 CAPSULE, GELATIN COATED ORAL
Qty: 90 | Refills: 3 | Status: DISCONTINUED | COMMUNITY
Start: 2024-04-25 | End: 2024-06-12

## 2024-06-12 RX ORDER — TROSPIUM CHLORIDE 60 MG/1
60 CAPSULE, EXTENDED RELEASE ORAL
Refills: 0 | Status: ACTIVE | COMMUNITY

## 2024-06-12 RX ORDER — ALPRAZOLAM 0.5 MG/1
0.5 TABLET ORAL DAILY
Qty: 30 | Refills: 0 | Status: DISCONTINUED | COMMUNITY
Start: 2020-04-07 | End: 2024-06-12

## 2024-06-12 RX ORDER — LINACLOTIDE 72 UG/1
72 CAPSULE, GELATIN COATED ORAL
Qty: 90 | Refills: 3 | Status: DISCONTINUED | COMMUNITY
Start: 2024-02-23 | End: 2024-06-12

## 2024-06-12 RX ORDER — ALPRAZOLAM 0.5 MG/1
0.5 TABLET ORAL 3 TIMES DAILY
Qty: 45 | Refills: 0 | Status: DISCONTINUED | COMMUNITY
End: 2024-06-12

## 2024-06-12 NOTE — HEALTH RISK ASSESSMENT
[0] : 2) Feeling down, depressed, or hopeless: Not at all (0) [PHQ-2 Negative - No further assessment needed] : PHQ-2 Negative - No further assessment needed [Current] : Current [20 or more] : 20 or more [[id number]] : 0

## 2024-06-12 NOTE — HISTORY OF PRESENT ILLNESS
[FreeTextEntry1] : Establish care [de-identified] : Ms. JACQUE BROWN is a 78-year-old female with PMH of IBS w/ constipation/chronic abdominal pain, anxiety and depression, diffuse large B cell lymphoma s/p CHOP chemotherapy and radiation who comes to the office to establish care.   Previous PCP: [facility] GI: Stephane Jacosb/onc: Maria E

## 2024-06-12 NOTE — PLAN
[FreeTextEntry1] :  Depression/anxiety On Xanax 0.5 mg BID PRN On Celexa 40 mg QD  HLD Continue atorvastatin 40 mg QHS Check lipids Diet, exercise  Leukocytosis She was on steroids Check CBC Follow up with her heme/onc  Colon cancer screen sees GI for colonoscopy Following up with breast surg for her mammogram. No longer doing pap smears  Smoker Counseled provided regarding the risk of smoking and benefits of quitting. Also, regarding different options for quitting including but not limited to nicotine replacement therapy and other medications.   Not interested in lung cancer CT scan for screening at this time.  This was an extensive visit that lasted > 40 minutes and included review of previous labs and specialists notes before and after the face-to-face encounter.   Return to care: within 3 months for AWE or sooner should the need arise Call or return for any questions

## 2024-06-27 ENCOUNTER — APPOINTMENT (OUTPATIENT)
Dept: GASTROENTEROLOGY | Facility: CLINIC | Age: 79
End: 2024-06-27
Payer: MEDICARE

## 2024-06-27 VITALS
BODY MASS INDEX: 17.75 KG/M2 | HEIGHT: 61 IN | WEIGHT: 94 LBS | DIASTOLIC BLOOD PRESSURE: 62 MMHG | OXYGEN SATURATION: 96 % | HEART RATE: 80 BPM | SYSTOLIC BLOOD PRESSURE: 110 MMHG

## 2024-06-27 DIAGNOSIS — K58.2 MIXED IRRITABLE BOWEL SYNDROME: ICD-10-CM

## 2024-06-27 DIAGNOSIS — R10.13 EPIGASTRIC PAIN: ICD-10-CM

## 2024-06-27 DIAGNOSIS — R19.4 CHANGE IN BOWEL HABIT: ICD-10-CM

## 2024-06-27 DIAGNOSIS — K44.9 DIAPHRAGMATIC HERNIA W/OUT OBSTRUCTION OR GANGRENE: ICD-10-CM

## 2024-06-27 PROCEDURE — 99214 OFFICE O/P EST MOD 30 MIN: CPT

## 2024-07-07 ENCOUNTER — EMERGENCY (EMERGENCY)
Facility: HOSPITAL | Age: 79
LOS: 1 days | Discharge: HOME | End: 2024-07-07
Attending: STUDENT IN AN ORGANIZED HEALTH CARE EDUCATION/TRAINING PROGRAM | Admitting: STUDENT IN AN ORGANIZED HEALTH CARE EDUCATION/TRAINING PROGRAM
Payer: MEDICARE

## 2024-07-07 VITALS
TEMPERATURE: 98 F | RESPIRATION RATE: 18 BRPM | HEART RATE: 62 BPM | SYSTOLIC BLOOD PRESSURE: 130 MMHG | OXYGEN SATURATION: 95 % | DIASTOLIC BLOOD PRESSURE: 76 MMHG

## 2024-07-07 VITALS
HEIGHT: 61 IN | SYSTOLIC BLOOD PRESSURE: 119 MMHG | OXYGEN SATURATION: 98 % | WEIGHT: 91.05 LBS | TEMPERATURE: 98 F | HEART RATE: 77 BPM | DIASTOLIC BLOOD PRESSURE: 66 MMHG | RESPIRATION RATE: 17 BRPM

## 2024-07-07 LAB
ALBUMIN SERPL ELPH-MCNC: 3.9 G/DL — SIGNIFICANT CHANGE UP (ref 3.3–5)
ALP SERPL-CCNC: 53 U/L — SIGNIFICANT CHANGE UP (ref 40–120)
ALT FLD-CCNC: 17 U/L — SIGNIFICANT CHANGE UP (ref 12–78)
ANION GAP SERPL CALC-SCNC: 8 MMOL/L — SIGNIFICANT CHANGE UP (ref 5–17)
APPEARANCE UR: CLEAR — SIGNIFICANT CHANGE UP
AST SERPL-CCNC: 17 U/L — SIGNIFICANT CHANGE UP (ref 15–37)
BASOPHILS # BLD AUTO: 0 K/UL — SIGNIFICANT CHANGE UP (ref 0–0.2)
BASOPHILS NFR BLD AUTO: 0 % — SIGNIFICANT CHANGE UP (ref 0–2)
BILIRUB SERPL-MCNC: 0.4 MG/DL — SIGNIFICANT CHANGE UP (ref 0.2–1.2)
BILIRUB UR-MCNC: NEGATIVE — SIGNIFICANT CHANGE UP
BUN SERPL-MCNC: 15 MG/DL — SIGNIFICANT CHANGE UP (ref 7–23)
CALCIUM SERPL-MCNC: 10 MG/DL — SIGNIFICANT CHANGE UP (ref 8.5–10.1)
CHLORIDE SERPL-SCNC: 103 MMOL/L — SIGNIFICANT CHANGE UP (ref 96–108)
CO2 SERPL-SCNC: 28 MMOL/L — SIGNIFICANT CHANGE UP (ref 22–31)
COLOR SPEC: YELLOW — SIGNIFICANT CHANGE UP
CREAT SERPL-MCNC: 0.87 MG/DL — SIGNIFICANT CHANGE UP (ref 0.5–1.3)
DIFF PNL FLD: NEGATIVE — SIGNIFICANT CHANGE UP
EGFR: 69 ML/MIN/1.73M2 — SIGNIFICANT CHANGE UP
EOSINOPHIL # BLD AUTO: 0 K/UL — SIGNIFICANT CHANGE UP (ref 0–0.5)
EOSINOPHIL NFR BLD AUTO: 0 % — SIGNIFICANT CHANGE UP (ref 0–6)
EPI CELLS # UR: PRESENT
GLUCOSE SERPL-MCNC: 94 MG/DL — SIGNIFICANT CHANGE UP (ref 70–99)
GLUCOSE UR QL: NEGATIVE MG/DL — SIGNIFICANT CHANGE UP
HCT VFR BLD CALC: 42.9 % — SIGNIFICANT CHANGE UP (ref 34.5–45)
HGB BLD-MCNC: 14.2 G/DL — SIGNIFICANT CHANGE UP (ref 11.5–15.5)
KETONES UR-MCNC: NEGATIVE MG/DL — SIGNIFICANT CHANGE UP
LEUKOCYTE ESTERASE UR-ACNC: ABNORMAL
LIDOCAIN IGE QN: 39 U/L — SIGNIFICANT CHANGE UP (ref 13–75)
LYMPHOCYTES # BLD AUTO: 25 % — SIGNIFICANT CHANGE UP (ref 13–44)
LYMPHOCYTES # BLD AUTO: 3.67 K/UL — HIGH (ref 1–3.3)
MANUAL SMEAR VERIFICATION: SIGNIFICANT CHANGE UP
MCHC RBC-ENTMCNC: 29.2 PG — SIGNIFICANT CHANGE UP (ref 27–34)
MCHC RBC-ENTMCNC: 33.1 GM/DL — SIGNIFICANT CHANGE UP (ref 32–36)
MCV RBC AUTO: 88.3 FL — SIGNIFICANT CHANGE UP (ref 80–100)
MONOCYTES # BLD AUTO: 1.61 K/UL — HIGH (ref 0–0.9)
MONOCYTES NFR BLD AUTO: 11 % — SIGNIFICANT CHANGE UP (ref 2–14)
NEUTROPHILS # BLD AUTO: 9.4 K/UL — HIGH (ref 1.8–7.4)
NEUTROPHILS NFR BLD AUTO: 64 % — SIGNIFICANT CHANGE UP (ref 43–77)
NITRITE UR-MCNC: NEGATIVE — SIGNIFICANT CHANGE UP
NRBC # BLD: 0 /100 WBCS — SIGNIFICANT CHANGE UP (ref 0–0)
NRBC # BLD: SIGNIFICANT CHANGE UP /100 WBCS (ref 0–0)
PH UR: 7.5 — SIGNIFICANT CHANGE UP (ref 5–8)
PLAT MORPH BLD: NORMAL — SIGNIFICANT CHANGE UP
PLATELET # BLD AUTO: 347 K/UL — SIGNIFICANT CHANGE UP (ref 150–400)
POTASSIUM SERPL-MCNC: 3.6 MMOL/L — SIGNIFICANT CHANGE UP (ref 3.5–5.3)
POTASSIUM SERPL-SCNC: 3.6 MMOL/L — SIGNIFICANT CHANGE UP (ref 3.5–5.3)
PROT SERPL-MCNC: 7.6 G/DL — SIGNIFICANT CHANGE UP (ref 6–8.3)
PROT UR-MCNC: NEGATIVE MG/DL — SIGNIFICANT CHANGE UP
RBC # BLD: 4.86 M/UL — SIGNIFICANT CHANGE UP (ref 3.8–5.2)
RBC # FLD: 13.5 % — SIGNIFICANT CHANGE UP (ref 10.3–14.5)
RBC BLD AUTO: NORMAL — SIGNIFICANT CHANGE UP
RBC CASTS # UR COMP ASSIST: 0 /HPF — SIGNIFICANT CHANGE UP (ref 0–4)
SODIUM SERPL-SCNC: 139 MMOL/L — SIGNIFICANT CHANGE UP (ref 135–145)
SP GR SPEC: 1.01 — SIGNIFICANT CHANGE UP (ref 1–1.03)
UROBILINOGEN FLD QL: 0.2 MG/DL — SIGNIFICANT CHANGE UP (ref 0.2–1)
WBC # BLD: 14.68 K/UL — HIGH (ref 3.8–10.5)
WBC # FLD AUTO: 14.68 K/UL — HIGH (ref 3.8–10.5)
WBC UR QL: 12 /HPF — HIGH (ref 0–5)

## 2024-07-07 PROCEDURE — 96374 THER/PROPH/DIAG INJ IV PUSH: CPT | Mod: XU

## 2024-07-07 PROCEDURE — 83690 ASSAY OF LIPASE: CPT

## 2024-07-07 PROCEDURE — 36415 COLL VENOUS BLD VENIPUNCTURE: CPT

## 2024-07-07 PROCEDURE — 99284 EMERGENCY DEPT VISIT MOD MDM: CPT | Mod: 25

## 2024-07-07 PROCEDURE — 85025 COMPLETE CBC W/AUTO DIFF WBC: CPT

## 2024-07-07 PROCEDURE — 81001 URINALYSIS AUTO W/SCOPE: CPT

## 2024-07-07 PROCEDURE — 74177 CT ABD & PELVIS W/CONTRAST: CPT | Mod: 26,MC

## 2024-07-07 PROCEDURE — 74177 CT ABD & PELVIS W/CONTRAST: CPT | Mod: MC

## 2024-07-07 PROCEDURE — 99285 EMERGENCY DEPT VISIT HI MDM: CPT

## 2024-07-07 PROCEDURE — 96375 TX/PRO/DX INJ NEW DRUG ADDON: CPT | Mod: XU

## 2024-07-07 PROCEDURE — 87086 URINE CULTURE/COLONY COUNT: CPT

## 2024-07-07 PROCEDURE — 80053 COMPREHEN METABOLIC PANEL: CPT

## 2024-07-07 RX ORDER — ONDANSETRON HYDROCHLORIDE 2 MG/ML
4 INJECTION INTRAMUSCULAR; INTRAVENOUS ONCE
Refills: 0 | Status: COMPLETED | OUTPATIENT
Start: 2024-07-07 | End: 2024-07-07

## 2024-07-07 RX ORDER — IOHEXOL 350 MG/ML
30 INJECTION, SOLUTION INTRAVENOUS ONCE
Refills: 0 | Status: COMPLETED | OUTPATIENT
Start: 2024-07-07 | End: 2024-07-07

## 2024-07-07 RX ORDER — CEFTRIAXONE SODIUM 500 MG
1000 VIAL (EA) INJECTION ONCE
Refills: 0 | Status: COMPLETED | OUTPATIENT
Start: 2024-07-07 | End: 2024-07-07

## 2024-07-07 RX ORDER — SODIUM CHLORIDE 0.9 % (FLUSH) 0.9 %
1000 SYRINGE (ML) INJECTION ONCE
Refills: 0 | Status: COMPLETED | OUTPATIENT
Start: 2024-07-07 | End: 2024-07-07

## 2024-07-07 RX ORDER — ACETAMINOPHEN 325 MG
625 TABLET ORAL ONCE
Refills: 0 | Status: COMPLETED | OUTPATIENT
Start: 2024-07-07 | End: 2024-07-07

## 2024-07-07 RX ORDER — CEFPODOXIME PROXETIL 50 MG/5 ML
1 SUSPENSION, RECONSTITUTED, ORAL (ML) ORAL
Qty: 20 | Refills: 0
Start: 2024-07-07 | End: 2024-07-16

## 2024-07-07 RX ADMIN — Medication 1000 MILLILITER(S): at 12:05

## 2024-07-07 RX ADMIN — Medication 250 MILLIGRAM(S): at 12:04

## 2024-07-07 RX ADMIN — Medication 100 MILLIGRAM(S): at 14:33

## 2024-07-07 RX ADMIN — IOHEXOL 30 MILLILITER(S): 350 INJECTION, SOLUTION INTRAVENOUS at 12:04

## 2024-07-07 RX ADMIN — ONDANSETRON HYDROCHLORIDE 4 MILLIGRAM(S): 2 INJECTION INTRAMUSCULAR; INTRAVENOUS at 14:34

## 2024-07-08 ENCOUNTER — RX RENEWAL (OUTPATIENT)
Age: 79
End: 2024-07-08

## 2024-07-08 LAB
CULTURE RESULTS: SIGNIFICANT CHANGE UP
SPECIMEN SOURCE: SIGNIFICANT CHANGE UP

## 2024-07-09 ENCOUNTER — APPOINTMENT (OUTPATIENT)
Dept: GASTROENTEROLOGY | Facility: CLINIC | Age: 79
End: 2024-07-09
Payer: MEDICARE

## 2024-07-09 VITALS
OXYGEN SATURATION: 98 % | BODY MASS INDEX: 17.37 KG/M2 | HEART RATE: 70 BPM | SYSTOLIC BLOOD PRESSURE: 110 MMHG | DIASTOLIC BLOOD PRESSURE: 65 MMHG | WEIGHT: 92 LBS | HEIGHT: 61 IN | RESPIRATION RATE: 16 BRPM

## 2024-07-09 DIAGNOSIS — R19.4 CHANGE IN BOWEL HABIT: ICD-10-CM

## 2024-07-09 DIAGNOSIS — R19.8 OTHER SPECIFIED SYMPTOMS AND SIGNS INVOLVING THE DIGESTIVE SYSTEM AND ABDOMEN: ICD-10-CM

## 2024-07-09 DIAGNOSIS — K58.1 IRRITABLE BOWEL SYNDROME WITH CONSTIPATION: ICD-10-CM

## 2024-07-09 PROCEDURE — 99214 OFFICE O/P EST MOD 30 MIN: CPT

## 2024-07-09 RX ORDER — CITALOPRAM 40 MG/1
40 TABLET, FILM COATED ORAL
Refills: 0 | Status: ACTIVE | COMMUNITY
Start: 2024-07-09

## 2024-07-10 ENCOUNTER — NON-APPOINTMENT (OUTPATIENT)
Age: 79
End: 2024-07-10

## 2024-07-11 RX ORDER — HYOSCYAMINE SULFATE 0.12 MG/1
0.12 TABLET SUBLINGUAL 4 TIMES DAILY
Qty: 120 | Refills: 0 | Status: ACTIVE | COMMUNITY
Start: 2024-07-11 | End: 1900-01-01

## 2024-07-23 ENCOUNTER — RX RENEWAL (OUTPATIENT)
Age: 79
End: 2024-07-23

## 2024-07-25 ENCOUNTER — APPOINTMENT (OUTPATIENT)
Dept: GASTROENTEROLOGY | Facility: CLINIC | Age: 79
End: 2024-07-25

## 2024-08-05 ENCOUNTER — EMERGENCY (EMERGENCY)
Facility: HOSPITAL | Age: 79
LOS: 1 days | Discharge: HOME | End: 2024-08-05
Attending: EMERGENCY MEDICINE
Payer: MEDICARE

## 2024-08-05 ENCOUNTER — NON-APPOINTMENT (OUTPATIENT)
Age: 79
End: 2024-08-05

## 2024-08-05 VITALS
SYSTOLIC BLOOD PRESSURE: 122 MMHG | WEIGHT: 87.96 LBS | HEIGHT: 61 IN | OXYGEN SATURATION: 97 % | TEMPERATURE: 99 F | DIASTOLIC BLOOD PRESSURE: 72 MMHG | RESPIRATION RATE: 18 BRPM | HEART RATE: 83 BPM

## 2024-08-05 VITALS
RESPIRATION RATE: 18 BRPM | TEMPERATURE: 98 F | OXYGEN SATURATION: 97 % | SYSTOLIC BLOOD PRESSURE: 100 MMHG | DIASTOLIC BLOOD PRESSURE: 60 MMHG | HEART RATE: 66 BPM

## 2024-08-05 LAB
ALBUMIN SERPL ELPH-MCNC: 3.8 G/DL — SIGNIFICANT CHANGE UP (ref 3.3–5.2)
ALP SERPL-CCNC: 51 U/L — SIGNIFICANT CHANGE UP (ref 40–120)
ALT FLD-CCNC: 12 U/L — SIGNIFICANT CHANGE UP
ANION GAP SERPL CALC-SCNC: 10 MMOL/L — SIGNIFICANT CHANGE UP (ref 5–17)
AST SERPL-CCNC: 17 U/L — SIGNIFICANT CHANGE UP
BASOPHILS # BLD AUTO: 0.09 K/UL — SIGNIFICANT CHANGE UP (ref 0–0.2)
BASOPHILS NFR BLD AUTO: 0.9 % — SIGNIFICANT CHANGE UP (ref 0–2)
BILIRUB SERPL-MCNC: <0.2 MG/DL — LOW (ref 0.4–2)
BUN SERPL-MCNC: 12 MG/DL — SIGNIFICANT CHANGE UP (ref 8–20)
CALCIUM SERPL-MCNC: 9.6 MG/DL — SIGNIFICANT CHANGE UP (ref 8.4–10.5)
CHLORIDE SERPL-SCNC: 101 MMOL/L — SIGNIFICANT CHANGE UP (ref 96–108)
CO2 SERPL-SCNC: 26 MMOL/L — SIGNIFICANT CHANGE UP (ref 22–29)
CREAT SERPL-MCNC: 0.57 MG/DL — SIGNIFICANT CHANGE UP (ref 0.5–1.3)
EGFR: 94 ML/MIN/1.73M2 — SIGNIFICANT CHANGE UP
EOSINOPHIL # BLD AUTO: 0.23 K/UL — SIGNIFICANT CHANGE UP (ref 0–0.5)
EOSINOPHIL NFR BLD AUTO: 2.3 % — SIGNIFICANT CHANGE UP (ref 0–6)
GLUCOSE SERPL-MCNC: 75 MG/DL — SIGNIFICANT CHANGE UP (ref 70–99)
HCT VFR BLD CALC: 40.1 % — SIGNIFICANT CHANGE UP (ref 34.5–45)
HGB BLD-MCNC: 13.4 G/DL — SIGNIFICANT CHANGE UP (ref 11.5–15.5)
IMM GRANULOCYTES NFR BLD AUTO: 0.3 % — SIGNIFICANT CHANGE UP (ref 0–0.9)
LYMPHOCYTES # BLD AUTO: 2.29 K/UL — SIGNIFICANT CHANGE UP (ref 1–3.3)
LYMPHOCYTES # BLD AUTO: 22.4 % — SIGNIFICANT CHANGE UP (ref 13–44)
MAGNESIUM SERPL-MCNC: 1.9 MG/DL — SIGNIFICANT CHANGE UP (ref 1.6–2.6)
MCHC RBC-ENTMCNC: 29.1 PG — SIGNIFICANT CHANGE UP (ref 27–34)
MCHC RBC-ENTMCNC: 33.4 GM/DL — SIGNIFICANT CHANGE UP (ref 32–36)
MCV RBC AUTO: 87 FL — SIGNIFICANT CHANGE UP (ref 80–100)
MONOCYTES # BLD AUTO: 0.97 K/UL — HIGH (ref 0–0.9)
MONOCYTES NFR BLD AUTO: 9.5 % — SIGNIFICANT CHANGE UP (ref 2–14)
NEUTROPHILS # BLD AUTO: 6.61 K/UL — SIGNIFICANT CHANGE UP (ref 1.8–7.4)
NEUTROPHILS NFR BLD AUTO: 64.6 % — SIGNIFICANT CHANGE UP (ref 43–77)
PHOSPHATE SERPL-MCNC: 3.9 MG/DL — SIGNIFICANT CHANGE UP (ref 2.4–4.7)
PLATELET # BLD AUTO: 330 K/UL — SIGNIFICANT CHANGE UP (ref 150–400)
POTASSIUM SERPL-MCNC: 4.5 MMOL/L — SIGNIFICANT CHANGE UP (ref 3.5–5.3)
POTASSIUM SERPL-SCNC: 4.5 MMOL/L — SIGNIFICANT CHANGE UP (ref 3.5–5.3)
PROT SERPL-MCNC: 6.5 G/DL — LOW (ref 6.6–8.7)
RBC # BLD: 4.61 M/UL — SIGNIFICANT CHANGE UP (ref 3.8–5.2)
RBC # FLD: 13.4 % — SIGNIFICANT CHANGE UP (ref 10.3–14.5)
SODIUM SERPL-SCNC: 137 MMOL/L — SIGNIFICANT CHANGE UP (ref 135–145)
WBC # BLD: 10.22 K/UL — SIGNIFICANT CHANGE UP (ref 3.8–10.5)
WBC # FLD AUTO: 10.22 K/UL — SIGNIFICANT CHANGE UP (ref 3.8–10.5)

## 2024-08-05 PROCEDURE — 80053 COMPREHEN METABOLIC PANEL: CPT

## 2024-08-05 PROCEDURE — 83735 ASSAY OF MAGNESIUM: CPT

## 2024-08-05 PROCEDURE — 99284 EMERGENCY DEPT VISIT MOD MDM: CPT | Mod: 25

## 2024-08-05 PROCEDURE — 99284 EMERGENCY DEPT VISIT MOD MDM: CPT

## 2024-08-05 PROCEDURE — 84100 ASSAY OF PHOSPHORUS: CPT

## 2024-08-05 PROCEDURE — 85025 COMPLETE CBC W/AUTO DIFF WBC: CPT

## 2024-08-05 PROCEDURE — 36415 COLL VENOUS BLD VENIPUNCTURE: CPT

## 2024-08-05 PROCEDURE — 96375 TX/PRO/DX INJ NEW DRUG ADDON: CPT

## 2024-08-05 PROCEDURE — 96374 THER/PROPH/DIAG INJ IV PUSH: CPT

## 2024-08-05 RX ORDER — DEXTROSE MONOHYDRATE, SODIUM CHLORIDE, SODIUM LACTATE, CALCIUM CHLORIDE, MAGNESIUM CHLORIDE 1.5; 538; 448; 18.4; 5.08 G/100ML; MG/100ML; MG/100ML; MG/100ML; MG/100ML
1000 SOLUTION INTRAPERITONEAL ONCE
Refills: 0 | Status: COMPLETED | OUTPATIENT
Start: 2024-08-05 | End: 2024-08-05

## 2024-08-05 RX ORDER — ONDANSETRON HCL/PF 4 MG/2 ML
1 VIAL (ML) INJECTION
Qty: 30 | Refills: 0
Start: 2024-08-05 | End: 2024-08-14

## 2024-08-05 RX ORDER — KETOROLAC TROMETHAMINE 10 MG
15 TABLET ORAL ONCE
Refills: 0 | Status: DISCONTINUED | OUTPATIENT
Start: 2024-08-05 | End: 2024-08-05

## 2024-08-05 RX ORDER — PROCHLORPERAZINE MALEATE 10 MG/1
10 TABLET, FILM COATED ORAL ONCE
Refills: 0 | Status: COMPLETED | OUTPATIENT
Start: 2024-08-05 | End: 2024-08-05

## 2024-08-05 RX ADMIN — PROCHLORPERAZINE MALEATE 104 MILLIGRAM(S): 10 TABLET, FILM COATED ORAL at 12:24

## 2024-08-05 RX ADMIN — Medication 15 MILLIGRAM(S): at 12:23

## 2024-08-05 RX ADMIN — DEXTROSE MONOHYDRATE, SODIUM CHLORIDE, SODIUM LACTATE, CALCIUM CHLORIDE, MAGNESIUM CHLORIDE 1000 MILLILITER(S): 1.5; 538; 448; 18.4; 5.08 SOLUTION INTRAPERITONEAL at 12:25

## 2024-08-05 NOTE — ED PROVIDER NOTE - PROGRESS NOTE DETAILS
Helen Martinez, DO: patient reassessed and results shared. I asked her if she has sought counselling for her unfortunate situation. She states her family has encouraged her, but she feels that she has survived so much worse things without it. I offered emotional support, gave patient turkey sandwich and water. If patient tolerates PO, she can go home. Helen Martinez, DO: Patient tolerated PO in the ED. Will send ODT zofran to pharmacy.

## 2024-08-05 NOTE — ED ADULT NURSE NOTE - OBJECTIVE STATEMENT
79y/o female history of presents to the ED via EMS from home c/o 4/10 generalized abd pain and vomiting for 2 days. Patient states she called her Gi Dr. Cat and was told to come in for further evaluation. Denies fever, chills, numbness/tingling, urinary s/s. Patient A&Ox3, in no respiratory distress, no chest pain. Patient safety provided, call bell within reach and bed in the lowest position.

## 2024-08-05 NOTE — ED PROVIDER NOTE - CLINICAL SUMMARY MEDICAL DECISION MAKING FREE TEXT BOX
79 y/o F with PMH DLBCL in remission, IBS presents for her chronic lower abdominal pain x 4 years, now with intractable nausea and vomiting, unable to keep anything down. She notes weight loss to 82 lbs over this time frame and believes she cannot care for herself. On exam, abdomen is soft and diffusely non-tender without guarding. I offered rectal exam, but patient states her pain is chronic and she follows closely with colorectal surgery at [facility]SH. Will check labs, pain/nausea control, IV fluids and reassess.

## 2024-08-05 NOTE — ED ADULT NURSE NOTE - NSFALLUNIVINTERV_ED_ALL_ED
Bed/Stretcher in lowest position, wheels locked, appropriate side rails in place/Call bell, personal items and telephone in reach/Instruct patient to call for assistance before getting out of bed/chair/stretcher/Non-slip footwear applied when patient is off stretcher/[place] to call system/Physically safe environment - no spills, clutter or unnecessary equipment/Purposeful proactive rounding/Room/bathroom lighting operational, light cord in reach

## 2024-08-05 NOTE — ED PROVIDER NOTE - NS ED ROS FT
Const: + Fatigue, + generalized weakness. Denies fever, chills  HEENT: Denies blurry vision, sore throat  Neck: Denies neck pain/stiffness  Resp: Denies coughing, SOB  Cardiovascular: Denies CP, palpitations, LE edema  GI: + nausea, + vomiting, + abdominal pain, + constipation, + blood in stool. Denies diarrhea  : Denies urinary frequency/urgency/dysuria, hematuria  MSK: Denies back pain  Neuro: Denies HA, dizziness, numbness, weakness  Skin: Denies rashes.

## 2024-08-05 NOTE — ED PROVIDER NOTE - HIV OFFER
Previously Declined (within the last year) [place] Counseling: I discussed with the patient the risks of risankizumab-rzaa including but not limited to immunosuppression, and serious infections.  The patient understands that monitoring is required including a PPD at baseline and must alert us or the primary physician if symptoms of infection or other concerning signs are noted.

## 2024-08-05 NOTE — ED ADULT TRIAGE NOTE - CHIEF COMPLAINT QUOTE
hx of cancer and IBS c/o nausea/vomiting and abdominal pain. patient of dr. cowan due to dehydration.

## 2024-08-05 NOTE — ED PROVIDER NOTE - PATIENT PORTAL LINK FT
You can access the FollowMyHealth Patient Portal offered by [facility] by registering at the following website: [email or web address]. By joining [facility]’s FollowMyHealth portal, you will also be able to view your health information using other applications (apps) compatible with our system.

## 2024-08-05 NOTE — CHART NOTE - NSCHARTNOTEFT_GEN_A_CORE
SWNote: case in SW hand off for transportation assistance ,pt not medicaid recipient ([employer]) . Worker met with pt, alert and orientedx4. Informed about bus tickets availability or to call a taxi for private pay . Reportedly, pt can Uber herself (has the application on her cell phone) . Denies any other SW needs at this moment. Worker informed pt's ED MD(dr Lowe) and RN(Yusuf) understood, pt d/c.

## 2024-08-05 NOTE — ED PROVIDER NOTE - OBJECTIVE STATEMENT
78-year-old female with past medical history of diffuse large B-cell lymphoma in remission for the last 4 years, IBS presents complaining of her chronic abdominal pain that is constant, lower abdominal in nature, radiating to her rectum, associated with constipation, now with nausea and vomiting.  She is a patient of Dr. Cat's, has recently had a colonoscopy, was treated for colitis without improvement of her symptoms.  She denies any fevers.  She is on an antispasmodic, which is not helping with her pain.  She lives alone, and feels that she cannot take care of herself due to her unintentional weight loss, and intractable nausea and vomiting.  Her pain is unchanged for the last 4 years, she follows with a colorectal surgeon at [facility] who she saw 3 weeks ago.  She notes some blood in the toilet paper when she wipes, but has known history of hemorrhoids.  She is currently not a candidate for ligation at this time.  She notes marijuana helps with her pain, but is afraid to use it due to interaction with other medications.

## 2024-08-07 ENCOUNTER — APPOINTMENT (OUTPATIENT)
Dept: GASTROENTEROLOGY | Facility: CLINIC | Age: 79
End: 2024-08-07

## 2024-08-07 PROCEDURE — 99442: CPT | Mod: 93

## 2024-08-08 PROBLEM — K58.9 IRRITABLE BOWEL SYNDROME, UNSPECIFIED: Chronic | Status: ACTIVE | Noted: 2024-08-05

## 2024-08-08 PROBLEM — Z87.19 HISTORY OF INFLAMMATORY BOWEL DISEASE: Status: RESOLVED | Noted: 2024-03-27 | Resolved: 2024-08-08

## 2024-08-08 PROBLEM — K58.9 IRRITABLE BOWEL SYNDROME WITHOUT DIARRHEA: Chronic | Status: ACTIVE | Noted: 2024-08-05

## 2024-08-08 PROBLEM — C83.30 DIFFUSE LARGE B-CELL LYMPHOMA, UNSPECIFIED SITE: Chronic | Status: ACTIVE | Noted: 2024-08-05

## 2024-08-08 NOTE — HISTORY OF PRESENT ILLNESS
[FreeTextEntry1] : Patient is a 78 year old female, with PMH of osteoporosis, IBS-C, anxiety, depression, diffuse large B cell lymphoma s/p CHOP chemo/radiation in 2019, pelvic floor and bladder prolapse, who presents for 2nd opinion of her abdominal pain and constipation.  Pt has had IBS-C x 25 years. She has been under the care of Dr. Calderón for several years. She presents to with c/o chronic lower abdominal pain, throughout the day, no alleviating or aggravating factors. Pain has worsened over the last 2 years. She has associated rectal pressure and constipation. Stools are hard, pebble like along with some thin, difficult to pass stools. She sees blood when wiping but states it is from the "soft tissue" and not in the stool. She has associated bloating as well.  She had a colonoscopy in March 2024 with Dr. Calderón which showed colonic spasm, diverticulosis, internal hemorrhoids and congestion/erythema throughout the colon. Pathology was negative for microscopic colitis. Pt was started on Budesonide for possible microscopic colitis. She started on 9mg and has weaned down to 6mg daily. While weaning down budesonide, Dr. Calderón recently started her on Methylprednisolone 4mg BID about 2 weeks ago. She has had no relief with Budesonide of methylprednisolone.  Patient was seen at [place] [facility] a few day ago for abdominal pain. Pt had a CT A/P which showed no colitis, she was started on antibiotics for UTI.  Of note, pt had a CT a/p in October 2023 which showed dilation of main PD measuring up to 5mm with borderline prominence of CBD for which ampullary stenosis/occult ampullary lesion is no excluded. She had a f/u MRCP in October 2023 which showed normal pancreas.  She has had weight loss as well due to decreased appetite.  She tried Linzess in the past for constipation with little relief. Currently using Miralax QD.  Patient denies any significant cardiac or pulmonary conditions.  Patient denies pyrosis, dysphagia, nausea, vomiting.

## 2024-08-08 NOTE — ASSESSMENT
[FreeTextEntry1] : Patient is having a lot of GI symptoms.  I will start her on Motegrity at this time.  We will follow-up after that.  I spent 15 minutes on the encounter Tien Cat MD Gastroenterology

## 2024-08-23 ENCOUNTER — APPOINTMENT (OUTPATIENT)
Dept: INTERNAL MEDICINE | Facility: CLINIC | Age: 79
End: 2024-08-23
Payer: MEDICARE

## 2024-08-23 VITALS
HEIGHT: 61 IN | WEIGHT: 90 LBS | DIASTOLIC BLOOD PRESSURE: 69 MMHG | BODY MASS INDEX: 16.99 KG/M2 | SYSTOLIC BLOOD PRESSURE: 114 MMHG

## 2024-08-23 DIAGNOSIS — R79.9 ABNORMAL FINDING OF BLOOD CHEMISTRY, UNSPECIFIED: ICD-10-CM

## 2024-08-23 DIAGNOSIS — F32.A ANXIETY DISORDER, UNSPECIFIED: ICD-10-CM

## 2024-08-23 DIAGNOSIS — E78.5 HYPERLIPIDEMIA, UNSPECIFIED: ICD-10-CM

## 2024-08-23 DIAGNOSIS — C83.39 DIFFUSE LARGE B-CELL LYMPHOMA, EXTRANODAL AND SOLID ORGAN SITES: ICD-10-CM

## 2024-08-23 DIAGNOSIS — F17.210 NICOTINE DEPENDENCE, CIGARETTES, UNCOMPLICATED: ICD-10-CM

## 2024-08-23 DIAGNOSIS — K58.1 IRRITABLE BOWEL SYNDROME WITH CONSTIPATION: ICD-10-CM

## 2024-08-23 DIAGNOSIS — F41.9 ANXIETY DISORDER, UNSPECIFIED: ICD-10-CM

## 2024-08-23 PROCEDURE — G2211 COMPLEX E/M VISIT ADD ON: CPT

## 2024-08-23 PROCEDURE — 99214 OFFICE O/P EST MOD 30 MIN: CPT

## 2024-08-23 RX ORDER — HYDROXYZINE HYDROCHLORIDE 25 MG/1
25 TABLET ORAL
Qty: 90 | Refills: 3 | Status: ACTIVE | COMMUNITY
Start: 2024-08-23 | End: 1900-01-01

## 2024-08-23 NOTE — HISTORY OF PRESENT ILLNESS
[FreeTextEntry1] : follow up chronic medical conditions. [de-identified] : Ms. JACQUE BROWN is a 78-year-old female with PMH of IBS w/ constipation/chronic abdominal pain, anxiety and depression, diffuse large B cell lymphoma s/p CHOP chemotherapy and radiation who comes to the office for follow up chronic medical conditions. Patient denies fever, cough SOB. No other complaints at this time.    Previous PCP: [facility]: [facility] Heme/onc:

## 2024-08-23 NOTE — HEALTH RISK ASSESSMENT
[0] : 2) Feeling down, depressed, or hopeless: Not at all (0) [PHQ-2 Negative - No further assessment needed] : PHQ-2 Negative - No further assessment needed [Current] : Current [20 or more] : 20 or more [[id number]] : 0

## 2024-08-23 NOTE — PLAN
[FreeTextEntry1] : Depression/Anxiety-  Patient would like to come off of Xanax Patient will take Xanax 0.25 mg PO bid PRN for 2 months and will use Hydroxyzine 25mg PO tid Goal to come off of Xanax in 2-3 months and continue using Hydroxyzine   Patient denies suicidal ideation Patient will continue to take Citalopram 40 mg PO QD  ISTOP CHECKED Xanax prescribed PRN Patient was advised not to drink alcohol with this medication,  Patient advised not to drive while on this medication Patient was advised that long term side effects of this medication including, but not limited to, Addiction, withdrawal, Cognitive impairment,anterograde amnesia  Motor vehicle crashes, and increased risk of hip fracture.  Patient understands risk and wishes to continue medication.   History of IBS-C Gi doctor Dr. Garcia  Colon and rectal surgeon -Dr. EZEQUIEL Banks  HLD Continue atorvastatin 40 mg QHS Check lipids Diet, exercise   Lymphoma She was on steroids Check CBC Follow up with her heme/onc Dr. Sanderson  Smoker Counseled provided regarding the risk of smoking and benefits of quitting. Also, regarding different options for quitting including but not limited to nicotine replacement therapy and other medications.   Not interested in lung cancer CT scan for screening at this time.  This was an extensive visit that lasted > 40 minutes and included review of previous labs and specialists notes before and after the face-to-face encounter.   Return to care: within 6 months for AWE or sooner should the need arise

## 2024-09-23 ENCOUNTER — RX RENEWAL (OUTPATIENT)
Age: 79
End: 2024-09-23

## 2024-09-23 ENCOUNTER — APPOINTMENT (OUTPATIENT)
Dept: INTERNAL MEDICINE | Facility: CLINIC | Age: 79
End: 2024-09-23

## 2024-11-05 ENCOUNTER — TRANSCRIPTION ENCOUNTER (OUTPATIENT)
Age: 79
End: 2024-11-05

## 2024-11-07 ENCOUNTER — OUTPATIENT (OUTPATIENT)
Dept: OUTPATIENT SERVICES | Facility: HOSPITAL | Age: 79
LOS: 1 days | Discharge: HOME | End: 2024-11-07

## 2024-11-07 DIAGNOSIS — C85.90 NON-HODGKIN LYMPHOMA, UNSPECIFIED, UNSPECIFIED SITE: ICD-10-CM

## 2024-11-08 ENCOUNTER — RESULT REVIEW (OUTPATIENT)
Age: 79
End: 2024-11-08

## 2024-11-08 ENCOUNTER — APPOINTMENT (OUTPATIENT)
Dept: HEMATOLOGY ONCOLOGY | Facility: CLINIC | Age: 79
End: 2024-11-08
Payer: MEDICARE

## 2024-11-08 VITALS
BODY MASS INDEX: 17.49 KG/M2 | RESPIRATION RATE: 16 BRPM | WEIGHT: 92.59 LBS | OXYGEN SATURATION: 98 % | TEMPERATURE: 98.1 F | DIASTOLIC BLOOD PRESSURE: 75 MMHG | HEART RATE: 73 BPM | SYSTOLIC BLOOD PRESSURE: 128 MMHG

## 2024-11-08 DIAGNOSIS — C83.398 DIFFUSE LARGE B-CELL LYMPH OF EXTRNOD AND SOLID ORGAN SITES: ICD-10-CM

## 2024-11-08 LAB
ALBUMIN SERPL ELPH-MCNC: 4.2 G/DL
ALP BLD-CCNC: 60 U/L
ALT SERPL-CCNC: 13 U/L
ANION GAP SERPL CALC-SCNC: 11 MMOL/L
AST SERPL-CCNC: 23 U/L
BASOPHILS # BLD AUTO: 0.07 K/UL — SIGNIFICANT CHANGE UP (ref 0–0.2)
BASOPHILS NFR BLD AUTO: 0.7 % — SIGNIFICANT CHANGE UP (ref 0–2)
BILIRUB SERPL-MCNC: 0.2 MG/DL
BUN SERPL-MCNC: 8 MG/DL
CALCIUM SERPL-MCNC: 9.7 MG/DL
CHLORIDE SERPL-SCNC: 104 MMOL/L
CO2 SERPL-SCNC: 25 MMOL/L
CREAT SERPL-MCNC: 0.71 MG/DL
EGFR: 88 ML/MIN/1.73M2
EOSINOPHIL # BLD AUTO: 0.15 K/UL — SIGNIFICANT CHANGE UP (ref 0–0.5)
EOSINOPHIL NFR BLD AUTO: 1.6 % — SIGNIFICANT CHANGE UP (ref 0–6)
GLUCOSE SERPL-MCNC: 109 MG/DL
HCT VFR BLD CALC: 37.9 % — SIGNIFICANT CHANGE UP (ref 34.5–45)
HGB BLD-MCNC: 12.6 G/DL — SIGNIFICANT CHANGE UP (ref 11.5–15.5)
IMM GRANULOCYTES NFR BLD AUTO: 0.4 % — SIGNIFICANT CHANGE UP (ref 0–0.9)
LDH SERPL-CCNC: 186 U/L
LYMPHOCYTES # BLD AUTO: 2.05 K/UL — SIGNIFICANT CHANGE UP (ref 1–3.3)
LYMPHOCYTES # BLD AUTO: 21.4 % — SIGNIFICANT CHANGE UP (ref 13–44)
MCHC RBC-ENTMCNC: 28.8 PG — SIGNIFICANT CHANGE UP (ref 27–34)
MCHC RBC-ENTMCNC: 33.2 G/DL — SIGNIFICANT CHANGE UP (ref 32–36)
MCV RBC AUTO: 86.5 FL — SIGNIFICANT CHANGE UP (ref 80–100)
MONOCYTES # BLD AUTO: 0.87 K/UL — SIGNIFICANT CHANGE UP (ref 0–0.9)
MONOCYTES NFR BLD AUTO: 9.1 % — SIGNIFICANT CHANGE UP (ref 2–14)
NEUTROPHILS # BLD AUTO: 6.38 K/UL — SIGNIFICANT CHANGE UP (ref 1.8–7.4)
NEUTROPHILS NFR BLD AUTO: 66.8 % — SIGNIFICANT CHANGE UP (ref 43–77)
NRBC # BLD: 0 /100 WBCS — SIGNIFICANT CHANGE UP (ref 0–0)
NRBC BLD-RTO: 0 /100 WBCS — SIGNIFICANT CHANGE UP (ref 0–0)
PLATELET # BLD AUTO: 324 K/UL — SIGNIFICANT CHANGE UP (ref 150–400)
POTASSIUM SERPL-SCNC: 4.9 MMOL/L
PROT SERPL-MCNC: 6.6 G/DL
RBC # BLD: 4.38 M/UL — SIGNIFICANT CHANGE UP (ref 3.8–5.2)
RBC # FLD: 13.7 % — SIGNIFICANT CHANGE UP (ref 10.3–14.5)
SODIUM SERPL-SCNC: 140 MMOL/L
WBC # BLD: 9.56 K/UL — SIGNIFICANT CHANGE UP (ref 3.8–10.5)
WBC # FLD AUTO: 9.56 K/UL — SIGNIFICANT CHANGE UP (ref 3.8–10.5)

## 2024-11-08 PROCEDURE — 99214 OFFICE O/P EST MOD 30 MIN: CPT

## 2024-11-08 RX ORDER — ESOMEPRAZOLE MAGNESIUM 40 MG/1
40 CAPSULE, DELAYED RELEASE ORAL
Refills: 0 | Status: ACTIVE | COMMUNITY

## 2024-11-13 ENCOUNTER — APPOINTMENT (OUTPATIENT)
Dept: INTERNAL MEDICINE | Facility: CLINIC | Age: 79
End: 2024-11-13
Payer: MEDICARE

## 2024-11-13 VITALS
HEART RATE: 89 BPM | BODY MASS INDEX: 17.37 KG/M2 | DIASTOLIC BLOOD PRESSURE: 68 MMHG | SYSTOLIC BLOOD PRESSURE: 124 MMHG | HEIGHT: 61 IN | WEIGHT: 92 LBS

## 2024-11-13 DIAGNOSIS — L65.8 OTHER SPECIFIED NONSCARRING HAIR LOSS: ICD-10-CM

## 2024-11-13 DIAGNOSIS — F32.A ANXIETY DISORDER, UNSPECIFIED: ICD-10-CM

## 2024-11-13 DIAGNOSIS — E78.5 HYPERLIPIDEMIA, UNSPECIFIED: ICD-10-CM

## 2024-11-13 DIAGNOSIS — Z12.2 ENCOUNTER FOR SCREENING FOR MALIGNANT NEOPLASM OF RESPIRATORY ORGANS: ICD-10-CM

## 2024-11-13 DIAGNOSIS — F17.210 NICOTINE DEPENDENCE, CIGARETTES, UNCOMPLICATED: ICD-10-CM

## 2024-11-13 DIAGNOSIS — F41.9 ANXIETY DISORDER, UNSPECIFIED: ICD-10-CM

## 2024-11-13 DIAGNOSIS — R79.9 ABNORMAL FINDING OF BLOOD CHEMISTRY, UNSPECIFIED: ICD-10-CM

## 2024-11-13 PROCEDURE — 36415 COLL VENOUS BLD VENIPUNCTURE: CPT

## 2024-11-13 PROCEDURE — G2211 COMPLEX E/M VISIT ADD ON: CPT

## 2024-11-13 PROCEDURE — 99214 OFFICE O/P EST MOD 30 MIN: CPT

## 2024-11-14 LAB
CHOLEST SERPL-MCNC: 190 MG/DL
ESTIMATED AVERAGE GLUCOSE: 108 MG/DL
HBA1C MFR BLD HPLC: 5.4 %
HDLC SERPL-MCNC: 66 MG/DL
IRON SATN MFR SERPL: 25 %
IRON SERPL-MCNC: 78 UG/DL
LDLC SERPL CALC-MCNC: 104 MG/DL
NONHDLC SERPL-MCNC: 125 MG/DL
TIBC SERPL-MCNC: 315 UG/DL
TRIGL SERPL-MCNC: 115 MG/DL
TSH SERPL-ACNC: 2.9 UIU/ML
UIBC SERPL-MCNC: 237 UG/DL

## 2025-03-14 ENCOUNTER — APPOINTMENT (OUTPATIENT)
Dept: INTERNAL MEDICINE | Facility: CLINIC | Age: 80
End: 2025-03-14
Payer: MEDICARE

## 2025-03-14 VITALS
HEIGHT: 61 IN | SYSTOLIC BLOOD PRESSURE: 144 MMHG | HEART RATE: 75 BPM | DIASTOLIC BLOOD PRESSURE: 80 MMHG | BODY MASS INDEX: 17.37 KG/M2 | WEIGHT: 92 LBS

## 2025-03-14 DIAGNOSIS — R79.9 ABNORMAL FINDING OF BLOOD CHEMISTRY, UNSPECIFIED: ICD-10-CM

## 2025-03-14 DIAGNOSIS — E78.5 HYPERLIPIDEMIA, UNSPECIFIED: ICD-10-CM

## 2025-03-14 DIAGNOSIS — C83.398 DIFFUSE LARGE B-CELL LYMPH OF EXTRNOD AND SOLID ORGAN SITES: ICD-10-CM

## 2025-03-14 DIAGNOSIS — G89.29 UNSPECIFIED ABDOMINAL PAIN: ICD-10-CM

## 2025-03-14 DIAGNOSIS — F32.A ANXIETY DISORDER, UNSPECIFIED: ICD-10-CM

## 2025-03-14 DIAGNOSIS — Z12.2 ENCOUNTER FOR SCREENING FOR MALIGNANT NEOPLASM OF RESPIRATORY ORGANS: ICD-10-CM

## 2025-03-14 DIAGNOSIS — F41.9 ANXIETY DISORDER, UNSPECIFIED: ICD-10-CM

## 2025-03-14 DIAGNOSIS — R10.9 UNSPECIFIED ABDOMINAL PAIN: ICD-10-CM

## 2025-03-14 PROCEDURE — 99214 OFFICE O/P EST MOD 30 MIN: CPT

## 2025-03-14 PROCEDURE — 36415 COLL VENOUS BLD VENIPUNCTURE: CPT

## 2025-03-14 PROCEDURE — G2211 COMPLEX E/M VISIT ADD ON: CPT

## 2025-03-17 LAB
ALBUMIN SERPL ELPH-MCNC: 4.3 G/DL
ALP BLD-CCNC: 75 U/L
ALT SERPL-CCNC: 10 U/L
ANION GAP SERPL CALC-SCNC: 15 MMOL/L
AST SERPL-CCNC: 21 U/L
BASOPHILS # BLD AUTO: 0.1 K/UL
BASOPHILS NFR BLD AUTO: 1 %
BILIRUB SERPL-MCNC: 0.2 MG/DL
BUN SERPL-MCNC: 17 MG/DL
CALCIUM SERPL-MCNC: 10.2 MG/DL
CHLORIDE SERPL-SCNC: 102 MMOL/L
CHOLEST SERPL-MCNC: 215 MG/DL
CO2 SERPL-SCNC: 21 MMOL/L
CREAT SERPL-MCNC: 0.65 MG/DL
EGFRCR SERPLBLD CKD-EPI 2021: 91 ML/MIN/1.73M2
EOSINOPHIL # BLD AUTO: 0.13 K/UL
EOSINOPHIL NFR BLD AUTO: 1.3 %
ESTIMATED AVERAGE GLUCOSE: 117 MG/DL
GLUCOSE SERPL-MCNC: 89 MG/DL
HBA1C MFR BLD HPLC: 5.7 %
HCT VFR BLD CALC: 42.1 %
HDLC SERPL-MCNC: 65 MG/DL
HGB BLD-MCNC: 13.4 G/DL
IMM GRANULOCYTES NFR BLD AUTO: 0.6 %
LDLC SERPL CALC-MCNC: 117 MG/DL
LYMPHOCYTES # BLD AUTO: 1.64 K/UL
LYMPHOCYTES NFR BLD AUTO: 16.4 %
MAN DIFF?: NORMAL
MCHC RBC-ENTMCNC: 28.3 PG
MCHC RBC-ENTMCNC: 31.8 G/DL
MCV RBC AUTO: 88.8 FL
MONOCYTES # BLD AUTO: 0.96 K/UL
MONOCYTES NFR BLD AUTO: 9.6 %
NEUTROPHILS # BLD AUTO: 7.12 K/UL
NEUTROPHILS NFR BLD AUTO: 71.1 %
NONHDLC SERPL-MCNC: 150 MG/DL
PLATELET # BLD AUTO: 396 K/UL
POTASSIUM SERPL-SCNC: 4.5 MMOL/L
PROT SERPL-MCNC: 6.9 G/DL
RBC # BLD: 4.74 M/UL
RBC # FLD: 15.3 %
SODIUM SERPL-SCNC: 138 MMOL/L
TRIGL SERPL-MCNC: 187 MG/DL
TSH SERPL-ACNC: 1.81 UIU/ML
WBC # FLD AUTO: 10.01 K/UL

## 2025-04-03 ENCOUNTER — RX ONLY (RX ONLY)
Age: 80
End: 2025-04-03

## 2025-04-03 ENCOUNTER — OFFICE (OUTPATIENT)
Dept: URBAN - METROPOLITAN AREA CLINIC 63 | Facility: CLINIC | Age: 80
Setting detail: OPHTHALMOLOGY
End: 2025-04-03
Payer: MEDICARE

## 2025-04-03 DIAGNOSIS — H25.813: ICD-10-CM

## 2025-04-03 DIAGNOSIS — H01.001: ICD-10-CM

## 2025-04-03 DIAGNOSIS — H01.002: ICD-10-CM

## 2025-04-03 DIAGNOSIS — H02.834: ICD-10-CM

## 2025-04-03 DIAGNOSIS — H02.831: ICD-10-CM

## 2025-04-03 DIAGNOSIS — H01.005: ICD-10-CM

## 2025-04-03 DIAGNOSIS — H00.015: ICD-10-CM

## 2025-04-03 DIAGNOSIS — H16.223: ICD-10-CM

## 2025-04-03 DIAGNOSIS — H01.004: ICD-10-CM

## 2025-04-03 PROCEDURE — 92002 INTRM OPH EXAM NEW PATIENT: CPT | Performed by: INTERNAL MEDICINE

## 2025-04-03 ASSESSMENT — REFRACTION_AUTOREFRACTION
OD_CYLINDER: -0.50
OS_AXIS: 142
OD_AXIS: 010
OD_SPHERE: +1.50
OS_CYLINDER: -1.00
OS_SPHERE: +1.50

## 2025-04-03 ASSESSMENT — VISUAL ACUITY
OD_BCVA: 20/25-1
OS_BCVA: 20/25-1

## 2025-04-03 ASSESSMENT — SUPERFICIAL PUNCTATE KERATITIS (SPK)
OS_SPK: 1+
OD_SPK: 1+

## 2025-04-03 ASSESSMENT — KERATOMETRY
OD_K2POWER_DIOPTERS: 43.50
OD_AXISANGLE_DEGREES: 087
OD_K1POWER_DIOPTERS: 42.50
OS_AXISANGLE_DEGREES: 071
OS_K2POWER_DIOPTERS: 4.25
OS_K1POWER_DIOPTERS: 42.75

## 2025-04-03 ASSESSMENT — LID POSITION - DERMATOCHALASIS
OS_DERMATOCHALASIS: LUL 2+
OD_DERMATOCHALASIS: RUL 2+

## 2025-04-03 ASSESSMENT — CONFRONTATIONAL VISUAL FIELD TEST (CVF)
OS_FINDINGS: FULL
OD_FINDINGS: FULL

## 2025-04-03 ASSESSMENT — TONOMETRY
OS_IOP_MMHG: 15
OD_IOP_MMHG: 15

## 2025-04-03 ASSESSMENT — LID EXAM ASSESSMENTS
OS_BLEPHARITIS: LLL LUL 1+
OD_BLEPHARITIS: RLL RUL 1+
OS_DERMATOCHALASIS: 2+
OD_DERMATOCHALASIS: 2+

## 2025-05-21 ENCOUNTER — NON-APPOINTMENT (OUTPATIENT)
Age: 80
End: 2025-05-21

## 2025-05-21 ENCOUNTER — OUTPATIENT (OUTPATIENT)
Dept: OUTPATIENT SERVICES | Facility: HOSPITAL | Age: 80
LOS: 1 days | Discharge: HOME | End: 2025-05-21

## 2025-05-21 DIAGNOSIS — C85.90 NON-HODGKIN LYMPHOMA, UNSPECIFIED, UNSPECIFIED SITE: ICD-10-CM

## 2025-05-22 ENCOUNTER — RESULT REVIEW (OUTPATIENT)
Age: 80
End: 2025-05-22

## 2025-05-22 ENCOUNTER — APPOINTMENT (OUTPATIENT)
Dept: HEMATOLOGY ONCOLOGY | Facility: CLINIC | Age: 80
End: 2025-05-22
Payer: MEDICARE

## 2025-05-22 VITALS
WEIGHT: 100 LBS | HEIGHT: 61 IN | HEART RATE: 88 BPM | BODY MASS INDEX: 18.88 KG/M2 | TEMPERATURE: 97.1 F | OXYGEN SATURATION: 99 % | RESPIRATION RATE: 16 BRPM | DIASTOLIC BLOOD PRESSURE: 76 MMHG | SYSTOLIC BLOOD PRESSURE: 135 MMHG

## 2025-05-22 DIAGNOSIS — C83.398 DIFFUSE LARGE B-CELL LYMPH OF EXTRNOD AND SOLID ORGAN SITES: ICD-10-CM

## 2025-05-22 LAB
ALBUMIN SERPL ELPH-MCNC: 4.4 G/DL
ALP BLD-CCNC: 78 U/L
ALT SERPL-CCNC: 19 U/L
ANION GAP SERPL CALC-SCNC: 13 MMOL/L
AST SERPL-CCNC: 23 U/L
BASOPHILS # BLD AUTO: 0.12 K/UL — SIGNIFICANT CHANGE UP (ref 0–0.2)
BASOPHILS NFR BLD AUTO: 1.1 % — SIGNIFICANT CHANGE UP (ref 0–2)
BILIRUB SERPL-MCNC: 0.2 MG/DL
BUN SERPL-MCNC: 17 MG/DL
CALCIUM SERPL-MCNC: 9.9 MG/DL
CHLORIDE SERPL-SCNC: 102 MMOL/L
CO2 SERPL-SCNC: 25 MMOL/L
CREAT SERPL-MCNC: 0.66 MG/DL
EGFRCR SERPLBLD CKD-EPI 2021: 90 ML/MIN/1.73M2
EOSINOPHIL # BLD AUTO: 0.32 K/UL — SIGNIFICANT CHANGE UP (ref 0–0.5)
EOSINOPHIL NFR BLD AUTO: 2.8 % — SIGNIFICANT CHANGE UP (ref 0–6)
GLUCOSE SERPL-MCNC: 98 MG/DL
HCT VFR BLD CALC: 39.9 % — SIGNIFICANT CHANGE UP (ref 34.5–45)
HGB BLD-MCNC: 13.1 G/DL — SIGNIFICANT CHANGE UP (ref 11.5–15.5)
IMM GRANULOCYTES NFR BLD AUTO: 0.4 % — SIGNIFICANT CHANGE UP (ref 0–0.9)
LDH SERPL-CCNC: 190 U/L
LYMPHOCYTES # BLD AUTO: 2.87 K/UL — SIGNIFICANT CHANGE UP (ref 1–3.3)
LYMPHOCYTES # BLD AUTO: 25.4 % — SIGNIFICANT CHANGE UP (ref 13–44)
MCHC RBC-ENTMCNC: 28.6 PG — SIGNIFICANT CHANGE UP (ref 27–34)
MCHC RBC-ENTMCNC: 32.8 G/DL — SIGNIFICANT CHANGE UP (ref 32–36)
MCV RBC AUTO: 87.1 FL — SIGNIFICANT CHANGE UP (ref 80–100)
MONOCYTES # BLD AUTO: 1.06 K/UL — HIGH (ref 0–0.9)
MONOCYTES NFR BLD AUTO: 9.4 % — SIGNIFICANT CHANGE UP (ref 2–14)
NEUTROPHILS # BLD AUTO: 6.88 K/UL — SIGNIFICANT CHANGE UP (ref 1.8–7.4)
NEUTROPHILS NFR BLD AUTO: 60.9 % — SIGNIFICANT CHANGE UP (ref 43–77)
NRBC BLD AUTO-RTO: 0 /100 WBCS — SIGNIFICANT CHANGE UP (ref 0–0)
PLATELET # BLD AUTO: 403 K/UL — HIGH (ref 150–400)
POTASSIUM SERPL-SCNC: 4.9 MMOL/L
PROT SERPL-MCNC: 6.9 G/DL
RBC # BLD: 4.58 M/UL — SIGNIFICANT CHANGE UP (ref 3.8–5.2)
RBC # FLD: 15.2 % — HIGH (ref 10.3–14.5)
SODIUM SERPL-SCNC: 140 MMOL/L
WBC # BLD: 11.29 K/UL — HIGH (ref 3.8–10.5)
WBC # FLD AUTO: 11.29 K/UL — HIGH (ref 3.8–10.5)

## 2025-05-22 PROCEDURE — 99214 OFFICE O/P EST MOD 30 MIN: CPT

## 2025-05-22 RX ORDER — DULOXETINE HYDROCHLORIDE 30 MG/1
30 CAPSULE, DELAYED RELEASE PELLETS ORAL
Qty: 30 | Refills: 4 | Status: ACTIVE | COMMUNITY
Start: 2025-05-22 | End: 1900-01-01

## 2025-06-19 PROBLEM — R91.1 LUNG NODULE: Status: ACTIVE | Noted: 2025-06-19

## 2025-07-01 ENCOUNTER — TRANSCRIPTION ENCOUNTER (OUTPATIENT)
Age: 80
End: 2025-07-01